# Patient Record
Sex: FEMALE | Race: WHITE | NOT HISPANIC OR LATINO | Employment: FULL TIME | URBAN - METROPOLITAN AREA
[De-identification: names, ages, dates, MRNs, and addresses within clinical notes are randomized per-mention and may not be internally consistent; named-entity substitution may affect disease eponyms.]

---

## 2021-04-17 ENCOUNTER — ANCILLARY PROCEDURE (OUTPATIENT)
Dept: GENERAL RADIOLOGY | Facility: CLINIC | Age: 61
End: 2021-04-17
Attending: FAMILY MEDICINE
Payer: COMMERCIAL

## 2021-04-17 ENCOUNTER — OFFICE VISIT (OUTPATIENT)
Dept: ORTHOPEDICS | Facility: CLINIC | Age: 61
End: 2021-04-17
Payer: COMMERCIAL

## 2021-04-17 DIAGNOSIS — M25.531 RIGHT WRIST PAIN: Primary | ICD-10-CM

## 2021-04-17 DIAGNOSIS — S52.591A OTHER CLOSED FRACTURE OF DISTAL END OF RIGHT RADIUS, INITIAL ENCOUNTER: ICD-10-CM

## 2021-04-17 DIAGNOSIS — M25.531 RIGHT WRIST PAIN: ICD-10-CM

## 2021-04-17 PROCEDURE — 99203 OFFICE O/P NEW LOW 30 MIN: CPT | Performed by: FAMILY MEDICINE

## 2021-04-17 PROCEDURE — 73110 X-RAY EXAM OF WRIST: CPT | Mod: 76 | Performed by: RADIOLOGY

## 2021-04-17 PROCEDURE — 73110 X-RAY EXAM OF WRIST: CPT | Mod: RT | Performed by: RADIOLOGY

## 2021-04-17 NOTE — PROGRESS NOTES
[facility] CLINICS AND SURGERY CENTER  SPORTS & ORTHOPEDIC CLINIC VISIT     Apr 17, 2021        ASSESSMENT & PLAN    59 yo female with displaced right distal radius fracture    Reviewed imaging and assessment with patient in detail  After consent patient, anesthesia was obtained with 7 mL lidocaine for hematoma block  The patient was placed in finger traps and reduction was attempted, but no meaningful improvement in alignment was obtained  She will require up with hand surgeon for further treatment  She was placed in plaster sugartong splint. Neurovascularly intact after splinting and reduction attempts  She will contact us if she does not have adequate pain control with tylenol and ibuprofen alone    Trenton Triplett MD  [facility] ORTHOPEDIC [facility]    -----  Chief Complaint   Patient presents with     Right Wrist - Pain       SUBJECTIVE  Paulette Abobtt is a/an 60 year old female who is seen in consultation at the request of primary care for evaluation of  Right wrist injury.     The patient is seen by themselves.  The patient is Right handed    Onset: 1 day(s) ago. Patient describes injury as tripping over the curb last night. Patient had a video visit with primary care and was sent to us,   Location of Pain: right wrist  Worsened by: Movement   Better with: Elevation   Treatments tried: no treatment tried to date  Associated symptoms: swelling    Orthopedic/Surgical history: NO  Social History/Occupation: CPA       REVIEW OF SYSTEMS:    Do you have fever, chills, weight loss? No    Do you have any vision problems? No    Do you have any chest pain or edema? No    Do you have any shortness of breath or wheezing?  No    Do you have stomach problems? No    Do you have any numbness or focal weakness? No    Do you have diabetes? No    Do you have problems with bleeding or clotting? No    Do you have an rashes or other skin lesions? No    OBJECTIVE:  There were no vitals taken for this visit.      General: Alert, pleasant, no distress  Right wrist: warm, well perfused, SILT throughout. Cap refill brisk     Inspection: swelling and deformity of distal radius     ROM:not attempted     Strength:intact in median ulnar radial nerve      Palpation:no ttp over distal ulna, carpal bones including scaphoid     Special Tests: none      RADIOLOGY:    3 view xrays of right wrist performed and reviewed independently demonstrating comminuted impacted volarly angulated distal radius fracture. See EMR for formal radiology report.       Cast/splint application    Date/Time: 4/17/2021 12:54 PM  Performed by: Angelia Carter ATC  Authorized by: Trenton Triplett MD     Consent:     Consent obtained:  Verbal    Consent given by:  Patient    Risks discussed:  Discoloration, numbness, pain and swelling    Alternatives discussed:  No treatment  Pre-procedure details:     Sensation:  Normal  Procedure details:     Laterality:  Right    Location:  Wrist    Wrist:  R wrist    Splint type: Sugar tong.    Supplies:  Plaster  Post-procedure details:     Pain:  Improved    Pain level:  0/10    Sensation:  Normal    Patient tolerance of procedure:  Tolerated well, no immediate complications    Patient provided with cast or splint care instructions: Yes    Comments:      Angelia Carter ATC on 4/17/2021 at 12:55 PM

## 2021-04-17 NOTE — LETTER
4/17/2021         RE: Paulette Abbott  [address]  [facility] [address]        Dear Colleague,    Thank you for referring your patient, Paulette Abbott, to the [facility]. Please see a copy of my visit note below.      [facility] CLINICS AND SURGERY CENTER  SPORTS & ORTHOPEDIC CLINIC VISIT     Apr 17, 2021        ASSESSMENT & PLAN    61 yo female with displaced right distal radius fracture    Reviewed imaging and assessment with patient in detail  After consent patient, anesthesia was obtained with 7 mL lidocaine for hematoma block  The patient was placed in finger traps and reduction was attempted, but no meaningful improvement in alignment was obtained  She will require up with hand surgeon for further treatment  She was placed in plaster sugartong splint. Neurovascularly intact after splinting and reduction attempts  She will contact us if she does not have adequate pain control with tylenol and ibuprofen alone    Trenton Triplett MD  [facility] ORTHOPEDIC [facility]    -----  Chief Complaint   Patient presents with     Right Wrist - Pain       SUBJECTIVE  Paulette Abbott is a/an 60 year old female who is seen in consultation at the request of primary care for evaluation of  Right wrist injury.     The patient is seen by themselves.  The patient is Right handed    Onset: 1 day(s) ago. Patient describes injury as tripping over the curb last night. Patient had a video visit with primary care and was sent to us,   Location of Pain: right wrist  Worsened by: Movement   Better with: Elevation   Treatments tried: no treatment tried to date  Associated symptoms: swelling    Orthopedic/Surgical history: NO  Social History/Occupation: CPA       REVIEW OF SYSTEMS:    Do you have fever, chills, weight loss? No    Do you have any vision problems? No    Do you have any chest pain or edema? No    Do you have any shortness of breath or wheezing?   No    Do you have stomach problems? No    Do you have any numbness or focal weakness? No    Do you have diabetes? No    Do you have problems with bleeding or clotting? No    Do you have an rashes or other skin lesions? No    OBJECTIVE:  There were no vitals taken for this visit.     General: Alert, pleasant, no distress  Right wrist: warm, well perfused, SILT throughout. Cap refill brisk     Inspection: swelling and deformity of distal radius     ROM:not attempted     Strength:intact in median ulnar radial nerve      Palpation:no ttp over distal ulna, carpal bones including scaphoid     Special Tests: none      RADIOLOGY:    3 view xrays of right wrist performed and reviewed independently demonstrating comminuted impacted volarly angulated distal radius fracture. See EMR for formal radiology report.       Cast/splint application    Date/Time: 4/17/2021 12:54 PM  Performed by: Angelia Carter ATC  Authorized by: Trenton Triplett MD     Consent:     Consent obtained:  Verbal    Consent given by:  Patient    Risks discussed:  Discoloration, numbness, pain and swelling    Alternatives discussed:  No treatment  Pre-procedure details:     Sensation:  Normal  Procedure details:     Laterality:  Right    Location:  Wrist    Wrist:  R wrist    Splint type: Sugar tong.    Supplies:  Plaster  Post-procedure details:     Pain:  Improved    Pain level:  0/10    Sensation:  Normal    Patient tolerance of procedure:  Tolerated well, no immediate complications    Patient provided with cast or splint care instructions: Yes    Comments:      Angelia Carter ATC on 4/17/2021 at 12:55 PM                  Again, thank you for allowing me to participate in the care of your patient.        Sincerely,        Trenton Triplett MD

## 2021-04-17 NOTE — LETTER
Date:April 19, 2021      Patient was self referred, no letter generated. Do not send.        [facility] Health Information

## 2021-04-19 ENCOUNTER — OFFICE VISIT (OUTPATIENT)
Dept: FAMILY MEDICINE | Facility: CLINIC | Age: 61
End: 2021-04-19
Payer: COMMERCIAL

## 2021-04-19 ENCOUNTER — OFFICE VISIT (OUTPATIENT)
Dept: ORTHOPEDICS | Facility: CLINIC | Age: 61
End: 2021-04-19
Payer: COMMERCIAL

## 2021-04-19 ENCOUNTER — TELEPHONE (OUTPATIENT)
Dept: ORTHOPEDICS | Facility: CLINIC | Age: 61
End: 2021-04-19

## 2021-04-19 ENCOUNTER — PRE VISIT (OUTPATIENT)
Dept: ORTHOPEDICS | Facility: CLINIC | Age: 61
End: 2021-04-19

## 2021-04-19 VITALS
WEIGHT: 155.8 LBS | BODY MASS INDEX: 29.42 KG/M2 | HEIGHT: 61 IN | TEMPERATURE: 98.9 F | OXYGEN SATURATION: 98 % | DIASTOLIC BLOOD PRESSURE: 103 MMHG | SYSTOLIC BLOOD PRESSURE: 151 MMHG | HEART RATE: 92 BPM

## 2021-04-19 DIAGNOSIS — S52.501A CLOSED FRACTURE OF RIGHT DISTAL RADIUS: ICD-10-CM

## 2021-04-19 DIAGNOSIS — Z01.818 PRE-OPERATIVE GENERAL PHYSICAL EXAMINATION: Primary | ICD-10-CM

## 2021-04-19 DIAGNOSIS — S52.571A OTHER CLOSED INTRA-ARTICULAR FRACTURE OF DISTAL END OF RIGHT RADIUS, INITIAL ENCOUNTER: Primary | ICD-10-CM

## 2021-04-19 LAB
ALBUMIN SERPL-MCNC: 4 G/DL (ref 3.4–5)
ALP SERPL-CCNC: 76 U/L (ref 40–150)
ALT SERPL W P-5'-P-CCNC: 15 U/L (ref 0–50)
ANION GAP SERPL CALCULATED.3IONS-SCNC: 11 MMOL/L (ref 3–14)
AST SERPL W P-5'-P-CCNC: 15 U/L (ref 0–45)
BASOPHILS # BLD AUTO: 0 10E9/L (ref 0–0.2)
BASOPHILS NFR BLD AUTO: 0.4 %
BILIRUB SERPL-MCNC: 0.6 MG/DL (ref 0.2–1.3)
BUN SERPL-MCNC: 11 MG/DL (ref 7–30)
CALCIUM SERPL-MCNC: 9.6 MG/DL (ref 8.5–10.1)
CHLORIDE SERPL-SCNC: 107 MMOL/L (ref 94–109)
CO2 SERPL-SCNC: 21 MMOL/L (ref 20–32)
CREAT SERPL-MCNC: 0.62 MG/DL (ref 0.52–1.04)
DIFFERENTIAL METHOD BLD: NORMAL
EOSINOPHIL # BLD AUTO: 0 10E9/L (ref 0–0.7)
EOSINOPHIL NFR BLD AUTO: 0.3 %
ERYTHROCYTE [DISTWIDTH] IN BLOOD BY AUTOMATED COUNT: 11.7 % (ref 10–15)
GFR SERPL CREATININE-BSD FRML MDRD: >90 ML/MIN/{1.73_M2}
GLUCOSE SERPL-MCNC: 94 MG/DL (ref 70–99)
HCT VFR BLD AUTO: 42.5 % (ref 35–47)
HGB BLD-MCNC: 14 G/DL (ref 11.7–15.7)
IMM GRANULOCYTES # BLD: 0 10E9/L (ref 0–0.4)
IMM GRANULOCYTES NFR BLD: 0.3 %
LABORATORY COMMENT REPORT: NORMAL
LYMPHOCYTES # BLD AUTO: 1.4 10E9/L (ref 0.8–5.3)
LYMPHOCYTES NFR BLD AUTO: 19.2 %
MCH RBC QN AUTO: 29.2 PG (ref 26.5–33)
MCHC RBC AUTO-ENTMCNC: 32.9 G/DL (ref 31.5–36.5)
MCV RBC AUTO: 89 FL (ref 78–100)
MONOCYTES # BLD AUTO: 0.5 10E9/L (ref 0–1.3)
MONOCYTES NFR BLD AUTO: 6.5 %
NEUTROPHILS # BLD AUTO: 5.4 10E9/L (ref 1.6–8.3)
NEUTROPHILS NFR BLD AUTO: 73.3 %
NRBC # BLD AUTO: 0 10*3/UL
NRBC BLD AUTO-RTO: 0 /100
PLATELET # BLD AUTO: 164 10E9/L (ref 150–450)
POTASSIUM SERPL-SCNC: 3.8 MMOL/L (ref 3.4–5.3)
PROT SERPL-MCNC: 8.1 G/DL (ref 6.8–8.8)
RBC # BLD AUTO: 4.8 10E12/L (ref 3.8–5.2)
SARS-COV-2 RNA RESP QL NAA+PROBE: NEGATIVE
SARS-COV-2 RNA RESP QL NAA+PROBE: NORMAL
SODIUM SERPL-SCNC: 139 MMOL/L (ref 133–144)
SPECIMEN SOURCE: NORMAL
SPECIMEN SOURCE: NORMAL
TSH SERPL DL<=0.005 MIU/L-ACNC: 0.71 MU/L (ref 0.4–4)
WBC # BLD AUTO: 7.4 10E9/L (ref 4–11)

## 2021-04-19 PROCEDURE — U0005 INFEC AGEN DETEC AMPLI PROBE: HCPCS | Mod: 90 | Performed by: PATHOLOGY

## 2021-04-19 PROCEDURE — 99204 OFFICE O/P NEW MOD 45 MIN: CPT | Mod: 57 | Performed by: PHYSICIAN ASSISTANT

## 2021-04-19 PROCEDURE — U0003 INFECTIOUS AGENT DETECTION BY NUCLEIC ACID (DNA OR RNA); SEVERE ACUTE RESPIRATORY SYNDROME CORONAVIRUS 2 (SARS-COV-2) (CORONAVIRUS DISEASE [COVID-19]), AMPLIFIED PROBE TECHNIQUE, MAKING USE OF HIGH THROUGHPUT TECHNOLOGIES AS DESCRIBED BY CMS-2020-01-R: HCPCS | Mod: 90 | Performed by: PATHOLOGY

## 2021-04-19 PROCEDURE — 99000 SPECIMEN HANDLING OFFICE-LAB: CPT | Performed by: PATHOLOGY

## 2021-04-19 ASSESSMENT — PATIENT HEALTH QUESTIONNAIRE - PHQ9: SUM OF ALL RESPONSES TO PHQ QUESTIONS 1-9: 5

## 2021-04-19 ASSESSMENT — MIFFLIN-ST. JEOR: SCORE: 1207.49

## 2021-04-19 NOTE — PROGRESS NOTES
"[facility]  [address] [address]  Phone: [phone]  Fax: [phone]  Primary Provider: No primary care provider on file.  Pre-op Performing Provider: MARIAH CHESTER   :[id number]}  PREOPERATIVE EVALUATION:  Today's date: 4/19/2021    Paulette Abbott is a 60 year old female who presents for a preoperative evaluation.    Surgical Information:  Surgery/Procedure: OPEN REDUCTION INTERNAL FIXATION, FRACTURE, WRIST  Surgery Location: [facility]  Surgeon: Edu Estrella MD  Surgery Date: 4/21/21  Time of Surgery: unknown  Where patient plans to recover: At home with family  Fax number for surgical facility: Note does not need to be faxed, will be available electronically in Epic.    Type of Anesthesia Anticipated: MAC with Block    Subjective     HPI related to upcoming procedure: Paulette slipped on a curb 3 days ago and fell on her right hand/wrist and then on the right side of her face.  She went to the orthopedic walk-in clinic the next morning at the [facility], was diagnosed with a fracture.  She is having surgery to repair on Wednesday, 4/21/21.      Paulette \"does not see health care providers,\"  She has been told off and on that her BP has been high, at work.  It was normal last year, but slightly elevated the year before.  She has not had routine health maintenance for many years.  She has not had any particular health problems.      Preop Questions 4/19/2021   1. Have you ever had a heart attack or stroke? No   2. Have you ever had surgery on your heart or blood vessels, such as a stent placement, a coronary artery bypass, or surgery on an artery in your head, neck, heart, or legs? No   3. Do you have chest pain with activity? No   4. Do you have a history of  heart failure? No   5. Do you currently have a cold, bronchitis or symptoms of other infection? No   6. Do you have a cough, shortness of breath, or wheezing? No   7. Do you or anyone in your family have previous " history of blood clots? No   8. Do you or does anyone in your family have a serious bleeding problem such as prolonged bleeding following surgeries or cuts? No   9. Have you ever had problems with anemia or been told to take iron pills? No   10. Have you had any abnormal blood loss such as black, tarry or bloody stools, or abnormal vaginal bleeding? No   11. Have you ever had a blood transfusion? No   12. Are you willing to have a blood transfusion if it is medically needed before, during, or after your surgery? Yes   13. Have you or any of your relatives ever had problems with anesthesia? No   14. Do you have sleep apnea, excessive snoring or daytime drowsiness? No   15. Do you have any artifical heart valves or other implanted medical devices like a pacemaker, defibrillator, or continuous glucose monitor? No   16. Do you have artificial joints? No   17. Are you allergic to latex? No     Health Care Directive:  Patient does not have a Health Care Directive or Living Will: Discussed advance care planning with patient; however, patient declined at this time.[id number]}    Review of Systems  CONSTITUTIONAL: NEGATIVE for fever, chills, change in weight  INTEGUMENTARY/SKIN: NEGATIVE for worrisome rashes, moles or lesions  EYES: NEGATIVE for vision changes or irritation  ENT/MOUTH: NEGATIVE for ear, mouth and throat problems  RESP: NEGATIVE for significant cough or SOB  BREAST: NEGATIVE for masses, tenderness or discharge  CV: NEGATIVE for chest pain, palpitations or peripheral edema  GI: NEGATIVE for nausea, abdominal pain, heartburn, or change in bowel habits  : NEGATIVE for frequency, dysuria, or hematuria  MUSCULOSKELETAL: NEGATIVE for significant arthralgias or myalgia  NEURO: NEGATIVE for weakness, dizziness or paresthesias  ENDOCRINE: NEGATIVE for temperature intolerance, skin/hair changes  HEME: NEGATIVE for bleeding problems  PSYCHIATRIC: NEGATIVE for changes in mood or affect    There are no active problems to  display for this patient.     No past medical history on file.  No past surgical history on file.  No current outpatient medications on file.       Allergies   Allergen Reactions     Penicillins Hives        Social History     Tobacco Use     Smoking status: Not on file   Substance Use Topics     Alcohol use: Not on file     Family History   Problem Relation Age of Onset     Hypertension Mother      Colon Cancer Father      Hypertension Father      Diabetes Maternal Grandmother      Rectal Cancer Brother      Hypertension Brother      History   Drug Use Not on file         Objective     There were no vitals taken for this visit.    Physical Exam    GENERAL APPEARANCE: healthy, alert and mild distress     EYES: EOMI, PERRL     HENT: ear canals and TM's normal and nose and mouth without ulcers or lesions  Face:  Bruising above right eye and forehead, right face.     NECK: no adenopathy, no asymmetry, masses, or scars and thyroid normal to palpation     RESP: lungs clear to auscultation - no rales, rhonchi or wheezes     CV: regular rates and rhythm, normal S1 S2, no S3 or S4 and no murmur, click or rub     ABDOMEN:  soft, nontender, no HSM or masses and bowel sounds normal     MS: extremities normal- no gross deformities noted, no evidence of inflammation in joints, FROM in all extremities, except right hand.  Right hand and arm bandaged and in a sling.  Fingers of the right hand slightly swollen and bruised.      SKIN: no suspicious lesions or rashes     NEURO: Normal strength and tone, sensory exam grossly normal, mentation intact and speech normal     PSYCH: mentation appears normal. and affect normal/bright     LYMPHATICS: No cervical adenopathy    Diagnostics:  Labs pending at this time.  Results will be reviewed when available.   No EKG required for low risk surgery (cataract, skin procedure, breast biopsy, etc).    Revised Cardiac Risk Index (RCRI):  The patient has the following serious cardiovascular risks  for perioperative complications:   - No serious cardiac risks = 0 points     RCRI Interpretation: 0 points: Class I (very low risk - 0.4% complication rate)    Paulette agreed to return to clinic for health maintenance and blood pressure concerns after recovery from surgery.         Signed Electronically by: Daysi Shoemaker NP  Copy of this evaluation report is provided to requesting physician.

## 2021-04-19 NOTE — LETTER
4/19/2021         RE: Paulette Abbott  [address]  [facility] [address]        Dear Colleague,    Thank you for referring your patient, Paulette Abbott, to the [facility] ORTHOPEDIC CLINIC [place]. Please see a copy of my visit note below.    Hand Surgery History & Physical    REFERRING PHYSICIAN: No ref. provider found   PRIMARY CARE PHYSICIAN: No primary care provider on file.     Chief Complaint:   Consult (right distal radius fx DOI 4/16/21 )    History of Present Illness:     Paulette Abbott is a 60 year old right-hand dominant female who presents for evaluation of right distal radius fracture. This occurred on 4/16 when she tripped on a curb, falling on her outstretched hand. She was seen at our orthopedic walk in clinic on Saturday 4/17 where she underwent closed reduction and splinting in a sugar tong splint.     She has been taking Tylenol as needed for pain. Pain has been well controlled. Denies numbness or tingling.    Past Medical History:   No past medical history on file.    Past Surgical History:   No past surgical history on file.    Social History:     Social History     Tobacco Use     Smoking status: Not on file   Substance Use Topics     Alcohol use: Not on file     Works as a CPA.     Family History:   No family history on file.    Allergies:   No Known Allergies    Medications:     No current outpatient medications on file.     No current facility-administered medications for this visit.         Review of Systems:     A 10 point ROS was performed and reviewed. Specific responses to these questions are noted at the end of the document.    Physical Exam:   Physical Exam Adult:  General: Well-nourished, alert, cooperative with exam and in no acute distress  HEENT: Atraumatic, normocephalic, extraocular movements intact, moist mucous membranes, trachea midline  Pulmonary: Unlabored work of breathing, on room air  Cardiovascular: Warm and well-perfused extremities. 2+  radial pulses  Skin: Warm, intact without rashes to the upper extremities, head or neck   Gait: Normal  Neuro/psych: Oriented to time, place and person. Affect is appropriate    Musculoskeletal: A focused physical examination of the RUE reveals:   Inspection - sugar tong splint in place. Edema and ecchymosis of the thumb and fingers.   Palpation - Deferred.   Neurovascular- intact light touch sensation and motor to distribution of the median, ulnar and radial nerves. Brisk capillary refill to the distal fingers. 2+ radial pulse.   Range of Motion: Able to flex and extend all fingers and thumbs within the confines of the splint.   Muscle strength and tone: 4/5 strength EPL, FPL, APB, first dorsal interosseous.               Imaging:   3 view X-ray of the right wrist was independently interpreted by me. The results were discussed with the patient.  Findings include: Comminuted intraarticular and mildly impacted distal radius fracture with slight volar displacement.     Assessment and Plan:   Assessment:  60 year old female with right closed intraarticular distal radius fracture.      Plan: We had a lengthy discussion about the diagnosis, radiographic findings, and possible treatment options.  I discussed with the patient that sometimes distal radius fractures can be treated with and without surgery.     In this case, in light of the current fracture alignment, I recommend surgical intervention in the form of open reduction internal fixation . The patient is in agreement. I briefly described the procedure and post operative plan. I discussed the  risks and benefits of surgery, including but not limited to: infection, bleeding, pain, scarring, damage to nerves, blood vessels, or other nearby structures, malunion, nonunion, hardware failure or malposition, stiffness, need for further surgery, wound healing issues, and anesthetic risks. The patient expressed understanding and elected to proceed with open reduction  internal fixation of the right distal radius fracture.  This will be performed by Dr. Estrella on Wednesday 4/21.     Radiographs and plan discussed with Dr. Edu Estrella who is in agreement with the plan.     Elly Posada PA-C   Orthopedic Surgery  4/19/2021 9:19 AM        Again, thank you for allowing me to participate in the care of your patient.        Sincerely,        Elly Posada PA-C

## 2021-04-19 NOTE — TELEPHONE ENCOUNTER
RECORDS RECEIVED FROM: Right distal radius fracture   DATE RECEIVED: Apr 19, 2021     NOTES STATUS DETAILS   OFFICE NOTE from referring provider Internal  Trenton Triplett MD        OFFICE NOTE from other specialist N/A    DISCHARGE SUMMARY from hospital N/A    DISCHARGE REPORT from the ER N/A    OPERATIVE REPORT N/A    MEDICATION LIST Internal    IMPLANT RECORD/STICKER N/A    LABS     CBC/DIFF N/A    CULTURES N/A    INJECTIONS DONE IN RADIOLOGY N/A    MRI N/A    CT SCAN N/A    XRAYS (IMAGES & REPORTS) Internal    TUMOR     PATHOLOGY  Slides & report N/A      04/19/21   9:35 AM   COMPLETE  Rosemarie Scott CMA

## 2021-04-19 NOTE — TELEPHONE ENCOUNTER
Called patient and LVM letting her know that we would like to have her come in to be evaluated for her distal radius fracture today (4/19/21). Left direct call back number to call back and make appointment.

## 2021-04-19 NOTE — NURSING NOTE
"ROOM:2    60 year old  Chief Complaint   Patient presents with     Pre-Op Exam     Fall       Blood pressure (!) 151/103, pulse 92, temperature 98.9  F (37.2  C), temperature source Oral, height 1.547 m (5' 0.9\"), weight 70.7 kg (155 lb 12.8 oz), SpO2 98 %. Body mass index is 29.53 kg/m .      Patient Active Problem List   Diagnosis     Closed fracture of right distal radius       Wt Readings from Last 2 Encounters:   04/19/21 70.7 kg (155 lb 12.8 oz)     BP Readings from Last 3 Encounters:   04/19/21 (!) 151/103       Allergies   Allergen Reactions     Penicillins Hives       No current outpatient medications on file.     No current facility-administered medications for this visit.        Social History     Tobacco Use     Smoking status: Former Smoker     Smokeless tobacco: Never Used   Substance Use Topics     Alcohol use: None     Drug use: None       Honoring Choices - Health Care Directive Guide offered to patient at time of visit.    Health Maintenance Due   Topic Date Due     PREVENTIVE CARE VISIT  Never done     ADVANCE CARE PLANNING  Never done     MAMMO SCREENING  Never done     COLORECTAL CANCER SCREENING  Never done     HIV SCREENING  Never done     HEPATITIS C SCREENING  Never done     PAP  Never done     DTAP/TDAP/TD IMMUNIZATION (1 - Tdap) Never done     LIPID  Never done     ZOSTER IMMUNIZATION (1 of 2) Never done     INFLUENZA VACCINE (1) 09/01/2020     COVID-19 Vaccine (2 - Pfizer 2-dose series) 04/22/2021         There is no immunization history on file for this patient.    No results found for: PAP      No lab results found.    PHQ-2 ( 1999 Pfizer) 4/17/2021   Q1: Little interest or pleasure in doing things 0   Q2: Feeling down, depressed or hopeless 0   PHQ-2 Score 0       PHQ-9 SCORE 4/19/2021   PHQ-9 Total Score 5       No flowsheet data found.    No flowsheet data found.      Keyanna Kline, [facility]  April 19, 2021 1:03 PM    "

## 2021-04-19 NOTE — LETTER
Date:April 23, 2021      Patient was self referred, no letter generated. Do not send.        [facility] Health Information

## 2021-04-19 NOTE — PROGRESS NOTES
Hand Surgery History & Physical    REFERRING PHYSICIAN: No ref. provider found   PRIMARY CARE PHYSICIAN: No primary care provider on file.     Chief Complaint:   Consult (right distal radius fx DOI 4/16/21 )    History of Present Illness:     Paulette Abbott is a 60 year old right-hand dominant female who presents for evaluation of right distal radius fracture. This occurred on 4/16 when she tripped on a curb, falling on her outstretched hand. She was seen at our orthopedic walk in clinic on Saturday 4/17 where she underwent closed reduction and splinting in a sugar tong splint.     She has been taking Tylenol as needed for pain. Pain has been well controlled. Denies numbness or tingling.    Past Medical History:   No past medical history on file.    Past Surgical History:   No past surgical history on file.    Social History:     Social History     Tobacco Use     Smoking status: Not on file   Substance Use Topics     Alcohol use: Not on file     Works as a CPA.     Family History:   No family history on file.    Allergies:   No Known Allergies    Medications:     No current outpatient medications on file.     No current facility-administered medications for this visit.         Review of Systems:     A 10 point ROS was performed and reviewed. Specific responses to these questions are noted at the end of the document.    Physical Exam:   Physical Exam Adult:  General: Well-nourished, alert, cooperative with exam and in no acute distress  HEENT: Atraumatic, normocephalic, extraocular movements intact, moist mucous membranes, trachea midline  Pulmonary: Unlabored work of breathing, on room air  Cardiovascular: Warm and well-perfused extremities. 2+ radial pulses  Skin: Warm, intact without rashes to the upper extremities, head or neck   Gait: Normal  Neuro/psych: Oriented to time, place and person. Affect is appropriate    Musculoskeletal: A focused physical examination of the RUE reveals:   Inspection - sugar tong  splint in place. Edema and ecchymosis of the thumb and fingers.   Palpation - Deferred.   Neurovascular- intact light touch sensation and motor to distribution of the median, ulnar and radial nerves. Brisk capillary refill to the distal fingers. 2+ radial pulse.   Range of Motion: Able to flex and extend all fingers and thumbs within the confines of the splint.   Muscle strength and tone: 4/5 strength EPL, FPL, APB, first dorsal interosseous.               Imaging:   3 view X-ray of the right wrist was independently interpreted by me. The results were discussed with the patient.  Findings include: Comminuted intraarticular and mildly impacted distal radius fracture with slight volar displacement.     Assessment and Plan:   Assessment:  60 year old female with right closed intraarticular distal radius fracture.      Plan: We had a lengthy discussion about the diagnosis, radiographic findings, and possible treatment options.  I discussed with the patient that sometimes distal radius fractures can be treated with and without surgery.     In this case, in light of the current fracture alignment, I recommend surgical intervention in the form of open reduction internal fixation . The patient is in agreement. I briefly described the procedure and post operative plan. I discussed the  risks and benefits of surgery, including but not limited to: infection, bleeding, pain, scarring, damage to nerves, blood vessels, or other nearby structures, malunion, nonunion, hardware failure or malposition, stiffness, need for further surgery, wound healing issues, and anesthetic risks. The patient expressed understanding and elected to proceed with open reduction internal fixation of the right distal radius fracture.  This will be performed by Dr. Estrella on Wednesday 4/21.     Radiographs and plan discussed with Dr. Edu Estrella who is in agreement with the plan.     Elly Posada PA-C   Orthopedic Surgery  4/19/2021 9:19 AM

## 2021-04-19 NOTE — NURSING NOTE
Teaching Flowsheet   Relevant Diagnosis: Closed intra-articular fracture of distal end of right radius  Teaching Topic: open reduction internal fixation right distal radius fracture repair    [facility] [place] MAC with Regional Block anesthesia with Dr Edu Estrella on Wednesday 4-21-21.    BMI 29.53  No current medications.  Covid test completed today at [facility] and pre-op will be today at 1:20 pm with NP clinic, [facility].     Person(s) involved in teaching:   Patient     Motivation Level:  Asks Questions: Yes  Eager to Learn: Yes  Cooperative: Yes  Receptive (willing/able to accept information): Yes  Any cultural factors/Muslim beliefs that may influence understanding or compliance? No       Patient demonstrates understanding of the following:  Reason for the appointment, diagnosis and treatment plan: Yes  Knowledge of proper use of medications and conditions for which they are ordered (with special attention to potential side effects or drug interactions): Yes  Which situations necessitate calling provider and whom to contact: Yes       Teaching Concerns Addressed:        Proper use and care of  (medical equip, care aids, etc.): Yes  Nutritional needs and diet plan: Yes  Pain management techniques: Yes  Wound Care: Yes  How and/when to access community resources: Yes     Instructional Materials Used/Given: Preoperative teaching packet, directions, phone numbers and map to the NP clinic [facility], antibacterial soap.  Nancy Garcia RN

## 2021-04-19 NOTE — NURSING NOTE
Reason For Visit:   Chief Complaint   Patient presents with     Consult     right distal radius fx DOI 4/16/21        Primary MD: No primary care provider on file.  Ref. MD:      ?  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  Work status?  Full time.  Date of injury: 4/16/21  Type of injury: Fall.  Date of surgery: NA  Type of surgery: NA.  Smoker: No  Request smoking cessation information: No      There were no vitals taken for this visit.      Pain Assessment  Patient Currently in Pain: Yes  0-10 Pain Scale: 3    Hand Dominance Evaluation  Hand Dominance: Right          QuickDASH Assessment  No flowsheet data found.       Allergies   Allergen Reactions     Penicillins Mike Figueroa, ATC

## 2021-04-20 ENCOUNTER — ANESTHESIA EVENT (OUTPATIENT)
Dept: SURGERY | Facility: AMBULATORY SURGERY CENTER | Age: 61
End: 2021-04-20

## 2021-04-21 ENCOUNTER — ANESTHESIA (OUTPATIENT)
Dept: SURGERY | Facility: AMBULATORY SURGERY CENTER | Age: 61
End: 2021-04-21

## 2021-04-21 ENCOUNTER — HOSPITAL ENCOUNTER (OUTPATIENT)
Facility: AMBULATORY SURGERY CENTER | Age: 61
Discharge: HOME | End: 2021-04-21
Attending: ORTHOPAEDIC SURGERY | Admitting: ORTHOPAEDIC SURGERY
Payer: COMMERCIAL

## 2021-04-21 VITALS
HEIGHT: 61 IN | DIASTOLIC BLOOD PRESSURE: 77 MMHG | SYSTOLIC BLOOD PRESSURE: 122 MMHG | TEMPERATURE: 98.8 F | BODY MASS INDEX: 29.27 KG/M2 | RESPIRATION RATE: 16 BRPM | HEART RATE: 74 BPM | WEIGHT: 155 LBS | OXYGEN SATURATION: 100 %

## 2021-04-21 DIAGNOSIS — S52.501A CLOSED FRACTURE OF RIGHT DISTAL RADIUS: ICD-10-CM

## 2021-04-21 DIAGNOSIS — S52.571A OTHER CLOSED INTRA-ARTICULAR FRACTURE OF DISTAL END OF RIGHT RADIUS, INITIAL ENCOUNTER: ICD-10-CM

## 2021-04-21 DIAGNOSIS — S52.501A CLOSED FRACTURE OF DISTAL END OF RIGHT RADIUS, UNSPECIFIED FRACTURE MORPHOLOGY, INITIAL ENCOUNTER: Primary | ICD-10-CM

## 2021-04-21 PROCEDURE — 25609 OPTX DST RD XART FX/EP SEP3+: CPT

## 2021-04-21 DEVICE — IMPLANTABLE DEVICE: Type: IMPLANTABLE DEVICE | Site: WRIST | Status: FUNCTIONAL

## 2021-04-21 RX ORDER — CLINDAMYCIN PHOSPHATE 900 MG/50ML
900 INJECTION, SOLUTION INTRAVENOUS
Status: COMPLETED | OUTPATIENT
Start: 2021-04-21 | End: 2021-04-21

## 2021-04-21 RX ORDER — GABAPENTIN 300 MG/1
300 CAPSULE ORAL ONCE
Status: COMPLETED | OUTPATIENT
Start: 2021-04-21 | End: 2021-04-21

## 2021-04-21 RX ORDER — OXYCODONE HYDROCHLORIDE 5 MG/1
5 TABLET ORAL
Status: DISCONTINUED | OUTPATIENT
Start: 2021-04-21 | End: 2021-04-22 | Stop reason: HOSPADM

## 2021-04-21 RX ORDER — ALBUTEROL SULFATE 0.83 MG/ML
2.5 SOLUTION RESPIRATORY (INHALATION) EVERY 4 HOURS PRN
Status: DISCONTINUED | OUTPATIENT
Start: 2021-04-21 | End: 2021-04-21 | Stop reason: HOSPADM

## 2021-04-21 RX ORDER — NALOXONE HYDROCHLORIDE 0.4 MG/ML
0.2 INJECTION, SOLUTION INTRAMUSCULAR; INTRAVENOUS; SUBCUTANEOUS
Status: DISCONTINUED | OUTPATIENT
Start: 2021-04-21 | End: 2021-04-21 | Stop reason: HOSPADM

## 2021-04-21 RX ORDER — LIDOCAINE 40 MG/G
CREAM TOPICAL
Status: DISCONTINUED | OUTPATIENT
Start: 2021-04-21 | End: 2021-04-21 | Stop reason: HOSPADM

## 2021-04-21 RX ORDER — FENTANYL CITRATE 50 UG/ML
25-50 INJECTION, SOLUTION INTRAMUSCULAR; INTRAVENOUS
Status: DISCONTINUED | OUTPATIENT
Start: 2021-04-21 | End: 2021-04-21 | Stop reason: HOSPADM

## 2021-04-21 RX ORDER — ACETAMINOPHEN 325 MG/1
650 TABLET ORAL EVERY 4 HOURS PRN
Qty: 50 TABLET | Refills: 0 | Status: SHIPPED | OUTPATIENT
Start: 2021-04-21 | End: 2021-10-25

## 2021-04-21 RX ORDER — CLINDAMYCIN PHOSPHATE 900 MG/50ML
900 INJECTION, SOLUTION INTRAVENOUS SEE ADMIN INSTRUCTIONS
Status: DISCONTINUED | OUTPATIENT
Start: 2021-04-21 | End: 2021-04-21 | Stop reason: HOSPADM

## 2021-04-21 RX ORDER — OXYCODONE HYDROCHLORIDE 5 MG/1
5 TABLET ORAL EVERY 4 HOURS PRN
Status: DISCONTINUED | OUTPATIENT
Start: 2021-04-21 | End: 2021-04-22 | Stop reason: HOSPADM

## 2021-04-21 RX ORDER — FLUMAZENIL 0.1 MG/ML
0.2 INJECTION, SOLUTION INTRAVENOUS
Status: DISCONTINUED | OUTPATIENT
Start: 2021-04-21 | End: 2021-04-21 | Stop reason: HOSPADM

## 2021-04-21 RX ORDER — NALOXONE HYDROCHLORIDE 0.4 MG/ML
0.2 INJECTION, SOLUTION INTRAMUSCULAR; INTRAVENOUS; SUBCUTANEOUS
Status: DISCONTINUED | OUTPATIENT
Start: 2021-04-21 | End: 2021-04-22 | Stop reason: HOSPADM

## 2021-04-21 RX ORDER — NALOXONE HYDROCHLORIDE 0.4 MG/ML
0.4 INJECTION, SOLUTION INTRAMUSCULAR; INTRAVENOUS; SUBCUTANEOUS
Status: DISCONTINUED | OUTPATIENT
Start: 2021-04-21 | End: 2021-04-21 | Stop reason: HOSPADM

## 2021-04-21 RX ORDER — OXYCODONE HYDROCHLORIDE 5 MG/1
5-10 TABLET ORAL EVERY 4 HOURS PRN
Qty: 6 TABLET | Refills: 0 | Status: SHIPPED | OUTPATIENT
Start: 2021-04-21 | End: 2021-10-25

## 2021-04-21 RX ORDER — SODIUM CHLORIDE, SODIUM LACTATE, POTASSIUM CHLORIDE, CALCIUM CHLORIDE 600; 310; 30; 20 MG/100ML; MG/100ML; MG/100ML; MG/100ML
INJECTION, SOLUTION INTRAVENOUS CONTINUOUS
Status: DISCONTINUED | OUTPATIENT
Start: 2021-04-21 | End: 2021-04-22 | Stop reason: HOSPADM

## 2021-04-21 RX ORDER — NALOXONE HYDROCHLORIDE 0.4 MG/ML
0.4 INJECTION, SOLUTION INTRAMUSCULAR; INTRAVENOUS; SUBCUTANEOUS
Status: DISCONTINUED | OUTPATIENT
Start: 2021-04-21 | End: 2021-04-22 | Stop reason: HOSPADM

## 2021-04-21 RX ORDER — MEPERIDINE HYDROCHLORIDE 25 MG/ML
12.5 INJECTION INTRAMUSCULAR; INTRAVENOUS; SUBCUTANEOUS
Status: DISCONTINUED | OUTPATIENT
Start: 2021-04-21 | End: 2021-04-22 | Stop reason: HOSPADM

## 2021-04-21 RX ORDER — PROPOFOL 10 MG/ML
INJECTION, EMULSION INTRAVENOUS CONTINUOUS PRN
Status: DISCONTINUED | OUTPATIENT
Start: 2021-04-21 | End: 2021-04-21

## 2021-04-21 RX ORDER — KETOROLAC TROMETHAMINE 30 MG/ML
30 INJECTION, SOLUTION INTRAMUSCULAR; INTRAVENOUS EVERY 6 HOURS PRN
Status: DISCONTINUED | OUTPATIENT
Start: 2021-04-21 | End: 2021-04-22 | Stop reason: HOSPADM

## 2021-04-21 RX ORDER — ACETAMINOPHEN 325 MG/1
650 TABLET ORAL
Status: DISCONTINUED | OUTPATIENT
Start: 2021-04-21 | End: 2021-04-22 | Stop reason: HOSPADM

## 2021-04-21 RX ORDER — ACETAMINOPHEN 325 MG/1
975 TABLET ORAL ONCE
Status: COMPLETED | OUTPATIENT
Start: 2021-04-21 | End: 2021-04-21

## 2021-04-21 RX ORDER — SODIUM CHLORIDE, SODIUM LACTATE, POTASSIUM CHLORIDE, CALCIUM CHLORIDE 600; 310; 30; 20 MG/100ML; MG/100ML; MG/100ML; MG/100ML
INJECTION, SOLUTION INTRAVENOUS CONTINUOUS
Status: DISCONTINUED | OUTPATIENT
Start: 2021-04-21 | End: 2021-04-21 | Stop reason: HOSPADM

## 2021-04-21 RX ORDER — ONDANSETRON 4 MG/1
4 TABLET, ORALLY DISINTEGRATING ORAL EVERY 30 MIN PRN
Status: DISCONTINUED | OUTPATIENT
Start: 2021-04-21 | End: 2021-04-22 | Stop reason: HOSPADM

## 2021-04-21 RX ORDER — BUPIVACAINE HYDROCHLORIDE 5 MG/ML
INJECTION, SOLUTION EPIDURAL; INTRACAUDAL PRN
Status: DISCONTINUED | OUTPATIENT
Start: 2021-04-21 | End: 2021-04-21

## 2021-04-21 RX ORDER — PROPOFOL 10 MG/ML
INJECTION, EMULSION INTRAVENOUS PRN
Status: DISCONTINUED | OUTPATIENT
Start: 2021-04-21 | End: 2021-04-21

## 2021-04-21 RX ORDER — ONDANSETRON 2 MG/ML
4 INJECTION INTRAMUSCULAR; INTRAVENOUS EVERY 30 MIN PRN
Status: DISCONTINUED | OUTPATIENT
Start: 2021-04-21 | End: 2021-04-22 | Stop reason: HOSPADM

## 2021-04-21 RX ADMIN — PROPOFOL 150 MCG/KG/MIN: 10 INJECTION, EMULSION INTRAVENOUS at 15:49

## 2021-04-21 RX ADMIN — BUPIVACAINE HYDROCHLORIDE 20 ML: 5 INJECTION, SOLUTION EPIDURAL; INTRACAUDAL at 14:48

## 2021-04-21 RX ADMIN — FENTANYL CITRATE 50 MCG: 50 INJECTION, SOLUTION INTRAMUSCULAR; INTRAVENOUS at 14:47

## 2021-04-21 RX ADMIN — SODIUM CHLORIDE, SODIUM LACTATE, POTASSIUM CHLORIDE, CALCIUM CHLORIDE: 600; 310; 30; 20 INJECTION, SOLUTION INTRAVENOUS at 14:34

## 2021-04-21 RX ADMIN — GABAPENTIN 300 MG: 300 CAPSULE ORAL at 14:30

## 2021-04-21 RX ADMIN — ACETAMINOPHEN 975 MG: 325 TABLET ORAL at 14:30

## 2021-04-21 RX ADMIN — PROPOFOL 50 MG: 10 INJECTION, EMULSION INTRAVENOUS at 15:49

## 2021-04-21 RX ADMIN — CLINDAMYCIN PHOSPHATE 900 MG: 900 INJECTION, SOLUTION INTRAVENOUS at 15:41

## 2021-04-21 ASSESSMENT — MIFFLIN-ST. JEOR: SCORE: 1205.46

## 2021-04-21 NOTE — DISCHARGE INSTRUCTIONS
"[facility]  Home Care Following Anesthesia  For 24 hours after surgery:  1. Get plenty of rest.  A responsible adult must stay with you for at least 24 hours after you leave the surgery center.  2. Do not drive or use heavy equipment.  If you have weakness or tingling, don't drive or use heavy equipment until this feeling goes away.   3. Do not drink alcohol.   4. Avoid strenuous or risky activities.  Ask for help when climbing stairs.  5. You may feel lightheaded.  IF so, sit for a few minutes before standing.  Have someone help you get up.   6. If you have nausea (feel sick to your stomach): Drink only clear liquids such as apple juice, ginger ale, broth or 7-Up.  Rest may also help.  Be sure to drink enough fluids.  Move to a regular diet as you feel able.   7. You may have a slight fever.  Call the doctor if your fever is over 100 F (37.7 C) (taken under the tongue) or lasts longer than 24 hours.  8. You may have a dry mouth, a sore throat, muscle aches or trouble sleeping. These should go away after 24 hours.  9. Do not make important or legal decisions.   10. It is recommended to avoid smoking.   Today you received an Exparel block to numb the nerves near your surgery site.  This is a block using local anesthetic or \"numbing\" medication injected around the nerves to anesthetize or \"numb\" the area supplied by those nerves.  This block is injected into the muscle layer near your surgical site.  This medication may numb the location where you had surgery up to 72 hours.  If your surgical site is an arm or leg you should be careful with your affected limb, since it is possible to injure your limb without being aware of it due to the numbing.  Until full feeling returns, you should guard against bumping or hitting your limb, and avoid extreme hot or cold temperatures on the skin.  As the block wears off, the feeling will return as a tingling or prickly sensation near your " "surgical site.  You will experince more discomfort from your incision as the feeling returns.  You may want to take a pain pill (a narcotic or Tylenol if this was prescribed by your surgeon) when you start to experience mild pain before the pain beomes more severe.  If your pain medications do not control your pain, you should notify your surgeon.\"}    Tips for taking pain medications  To get the best pain relief possible, remember these points:    Take pain medications as directed, before pain becomes severe.    Pain medication can upset your stomach: taking it with food may help.    Constipation is a common side effect of pain medication. Drink plenty of  fluids.    Eat foods high in fiber. Take a stool softener if recommended by your doctor or pharmacist.    Do not drink alcohol, drive or operate machinery while taking pain medications.    Ask about other ways to control pain, such as with heat, ice or relaxation.    Tylenol/Acetaminophen Consumption  To help encourage the safe use of acetaminophen, the makers of TYLENOL  have lowered the maximum daily dose for single-ingredient Extra Strength TYLENOL  (acetaminophen) products sold in the [place] from 8 pills per day (4,000 mg) to 6 pills per day (3,000 mg). The dosing interval has also changed from 2 pills every 4-6 hours to 2 pills every 6 hours.    If you feel your pain relief is insufficient, you may take Tylenol/Acetaminophen in addition to your narcotic pain medication.     Be careful not to exceed 3,000 mg of Tylenol/Acetaminophen in a 24 hour period from all sources.    If you are taking extra strength Tylenol/acetaminophen (500 mg), the maximum dose is 6 tablets in 24 hours.    If you are taking regular strength acetaminophen (325 mg), the maximum dose is 9 tablets in 24 hours.    Call a doctor for any of the followin. Signs of infection (fever, growing tenderness at the surgery site, a large amount of drainage or bleeding, severe pain, foul-smelling " drainage, redness, swelling).  2. It has been over 8 to 10 hours since surgery and you are still not able to urinate (pass water).  3. Headache for over 24 hours.  4. Numbness, tingling or weakness the day after surgery (if you had spinal anesthesia).  5. Signs of Covid-19 infection (temperature over 100 degrees, shortness of breath, cough, loss of taste/smell, generalized body aches, persistent headache, chills, sore throat, nausea/vomiting/diarrhea)  Your doctor is:  Dr. Edu Estrella, Orthopaedics: [phone]  ***               Or dial [phone] and ask for the resident on call for:  Orthopaedics  For emergency care, call the:  [facility] Emergency Department: [phone] (TTY for hearing impaired: [phone])  Tylenol 975 mg given @ 2:30 PM ok to take again@ @ 8:30 PM

## 2021-04-21 NOTE — ANESTHESIA POSTPROCEDURE EVALUATION
Patient: Paulette Abbott    Procedure(s):  OPEN REDUCTION INTERNAL FIXATION, FRACTURE, WRIST    Diagnosis:Closed fracture of right distal radius [S52.501A]  Diagnosis Additional Information: No value filed.    Anesthesia Type:  MAC    Note:  Disposition: Outpatient   Postop Pain Control: Uneventful            Sign Out: Well controlled pain   PONV: No   Neuro/Psych: Uneventful            Sign Out: Acceptable/Baseline neuro status   Airway/Respiratory: Uneventful            Sign Out: Acceptable/Baseline resp. status   CV/Hemodynamics: Uneventful            Sign Out: Acceptable CV status; No obvious hypovolemia; No obvious fluid overload   Other NRE: NONE   DID A NON-ROUTINE EVENT OCCUR? No           Last vitals:  Vitals:    04/21/21 1700 04/21/21 1703 04/21/21 1730   BP:  118/81 122/77   Pulse:  71 74   Resp:  16 16   Temp:  37.1  C (98.8  F)    SpO2: 99% 99% 100%       Last vitals prior to Anesthesia Care Transfer:  CRNA VITALS  4/21/2021 1628 - 4/21/2021 1728      4/21/2021             Pulse:  69    SpO2:  99 %          Electronically Signed By: Gm Peña MD  April 21, 2021  5:38 PM

## 2021-04-21 NOTE — ANESTHESIA PREPROCEDURE EVALUATION
Anesthesia Pre-Procedure Evaluation    Patient: Paulette Abbott   MRN: [id number] : 1960        Preoperative Diagnosis: Closed fracture of right distal radius [S52.501A]   Procedure : Procedure(s):  OPEN REDUCTION INTERNAL FIXATION, FRACTURE, WRIST     Past Medical History:   Diagnosis Date     Hypertension       Past Surgical History:   Procedure Laterality Date     EYE SURGERY        Allergies   Allergen Reactions     Penicillins Hives      Social History     Tobacco Use     Smoking status: Former Smoker     Smokeless tobacco: Never Used   Substance Use Topics     Alcohol use: Yes     Comment: 0-1/week      Wt Readings from Last 1 Encounters:   21 70.3 kg (155 lb)        Anesthesia Evaluation            ROS/MED HX  ENT/Pulmonary:       Neurologic:       Cardiovascular:     (+) hypertension-----    METS/Exercise Tolerance:     Hematologic:       Musculoskeletal:       GI/Hepatic:       Renal/Genitourinary:       Endo:       Psychiatric/Substance Use:       Infectious Disease:       Malignancy:       Other:            Physical Exam    Airway        Mallampati: II   TM distance: > 3 FB      Respiratory Devices and Support         Dental           Cardiovascular          Rhythm and rate: regular and normal     Pulmonary           breath sounds clear to auscultation           OUTSIDE LABS:  CBC:   Lab Results   Component Value Date    WBC 7.4 2021    HGB 14.0 2021    HCT 42.5 2021     2021     BMP:   Lab Results   Component Value Date     2021    POTASSIUM 3.8 2021    CHLORIDE 107 2021    CO2 21 2021    BUN 11 2021    CR 0.62 2021    GLC 94 2021     COAGS: No results found for: PTT, INR, FIBR  POC: No results found for: BGM, HCG, HCGS  HEPATIC:   Lab Results   Component Value Date    ALBUMIN 4.0 2021    PROTTOTAL 8.1 2021    ALT 15 2021    AST 15 2021    ALKPHOS 76 2021    BILITOTAL 0.6  04/19/2021     OTHER:   Lab Results   Component Value Date    KELSY 9.6 04/19/2021    TSH 0.71 04/19/2021       Anesthesia Plan    ASA Status:  2   NPO Status:  NPO Appropriate    Anesthesia Type: MAC.      Maintenance: TIVA.        Consents    Anesthesia Plan(s) and associated risks, benefits, and realistic alternatives discussed. Questions answered and patient/representative(s) expressed understanding.     - Discussed with:  Patient         Postoperative Care       PONV prophylaxis: Ondansetron (or other 5HT-3)     Comments:         H&P reviewed: Unable to attach H&P to encounter due to EHR limitations. H&P Update: appropriate H&P reviewed, patient examined. No interval changes since H&P (within 30 days).         Gm Peña MD

## 2021-04-21 NOTE — ANESTHESIA PROCEDURE NOTES
Supraclavicular Procedure Note  Pre-Procedure   Staff -        Anesthesiologist:  Gm Peña MD       Performed By: anesthesiologist       Location: pre-op       Pre-Anesthestic Checklist: patient identified, IV checked, site marked, risks and benefits discussed, informed consent, monitors and equipment checked, pre-op evaluation, at physician/surgeon's request and post-op pain management  Timeout:       Correct Patient: Yes        Correct Procedure: Yes        Correct Site: Yes        Correct Position: Yes        Correct Laterality: Yes        Site Marked: Yes  Procedure Documentation  Procedure: Supraclavicular       Diagnosis: POST OPERATIVE PAIN       Laterality: right       Patient Position: sitting       Patient Prep/Sterile Barriers: sterile gloves, mask       Skin prep: Chloraprep       Needle Type: short bevel and insulated       Needle Gauge: 21.        Needle Length (millimeters): 110        - Ultrasound guided       - Ultrasound used to identify targeted nerve, plexus, vascular marker, or fascial plane and place a needle adjacent to it in real-time       - Ultrasound was used to visualize the spread of anesthetic in close proximity to the above referenced structure       - A permanent image is entered into the patient's record.    Assessment/Narrative         The placement was negative for: blood aspirated, painful injection and site bleeding       Paresthesias: No.     Bolus given via needle..        Secured via.        Insertion/Infusion Method: Single Shot       Complications: none       Injection made incrementally with aspirations every 5 mL.    Comments:  133mg exparel used

## 2021-04-21 NOTE — ANESTHESIA CARE TRANSFER NOTE
Patient: Paulette Abbott    Procedure(s):  OPEN REDUCTION INTERNAL FIXATION, FRACTURE, WRIST    Diagnosis: Closed fracture of right distal radius [S52.501A]  Diagnosis Additional Information: No value filed.    Anesthesia Type:   MAC     Note:    Oropharynx: oropharynx clear of all foreign objects and spontaneously breathing  Level of Consciousness: awake  Oxygen Supplementation: room air    Independent Airway: airway patency satisfactory and stable  Dentition: dentition unchanged      Patient transferred to: Phase II  Comments: Uneventful transport   Report to RN  Exchanging well; color natl  Pt responds appropriately to command  IV patent  Lips/teeth/dentition as preop status  Questions answered  /77  HR 71  TAT 98.8  RR 16  Sat 100% room air    Handoff Report: Identifed the Patient, Identified the Reponsible Provider, Reviewed the pertinent medical history, Discussed the surgical course, Reviewed Intra-OP anesthesia mangement and issues during anesthesia, Set expectations for post-procedure period and Allowed opportunity for questions and acknowledgement of understanding      Vitals: (Last set prior to Anesthesia Care Transfer)  CRNA VITALS  4/21/2021 1628 - 4/21/2021 1705      4/21/2021             Pulse:  69    SpO2:  99 %        Electronically Signed By: OSORIO LANIER CRNA  April 21, 2021  5:05 PM

## 2021-04-22 ENCOUNTER — ANCILLARY PROCEDURE (OUTPATIENT)
Dept: RADIOLOGY | Facility: AMBULATORY SURGERY CENTER | Age: 61
End: 2021-04-22
Attending: ORTHOPAEDIC SURGERY
Payer: COMMERCIAL

## 2021-04-22 DIAGNOSIS — R52 PAIN: Primary | ICD-10-CM

## 2021-04-22 NOTE — OP NOTE
Procedure Date: 04/21/2021    DATE OF PROCEDURE:  04/21/2021    PREOPERATIVE DIAGNOSIS:  Right distal radius fracture.    POSTOPERATIVE DIAGNOSIS:  Right distal radius fracture.    PROCEDURE:  Open reduction and internal fixation of right distal radius fracture.    SURGEON:  Edu Estrella MD     ASSISTANT:  Martha Harrison MD, PGY-4    ANESTHESIA:  Regional block.    HISTORY OF PRESENT ILLNESS AND INDICATIONS:  Paulette is a 60-year-old female, fell and injured her right wrist.  She has a comminuted displaced right distal radius fracture.  She presents today for open reduction and internal fixation of the fracture.  We discussed the risks and benefits, and those include, but are not limited to, infection, bleeding, injury to blood vessels, tendons or nerves, stiffness, nonunion, malunion and possibly need for more surgery.  All questions were answered, and operative consents were signed.    PROCEDURE NOTE:  The patient was taken to the operating room after a regional block was supplied by the Anesthesia Service to the right upper extremity.  She was placed supine on the OR table, and the right hand was placed on the hand table.  A tourniquet was placed on the right upper extremity.  Right upper extremity was prepped and draped in standard sterile fashion.  The limb was exsanguinated, and the tourniquet was elevated to 250 mmHg.  A 6 cm volar incision was made over the FCR tendon, extending proximally from the wrist crease.  The soft tissues were dissected down to the FCR.  This was retracted ulnarly.  The radial artery was identified and protected.  Soft tissues were dissected bluntly down to the pronator quadratus.  This was released off of the radius and dissected ulnarly.  The fracture was identified.  It was fairly comminuted with a volar ulnar corner fragment, but this was extraarticular.  The fracture was reduced anatomically and then stabilized volarly with an Arthrex distal radius volar plate.  All screws  were placed in standard AO fashion.  Fluoroscopic views in the AP and lateral planes as well as a radial inclination view demonstrated an anatomic reduction of the fracture and appropriate placement of the hardware.  The wound was irrigated copiously.  The pronator was attempted to be repaired as much as possible with a 3-0 Vicryl.  The subcutaneous tissue was closed with 3-0 Vicryl, and the skin was closed with 5-0 nylon.  Her right wrist was placed in a volar splint.  Blood loss was approximately 5 mL. No complications, and she went to the recovery room in stable condition.    Postoperatively, sutures will be removed in approximately 7-10 days.  She can go into a thermoplastic OT splint at that point and begin some gentle early motion.    Edu Estrella MD        D: 2021   T: 2021   MT: Marcus    Name:     NATHAN MEYERS  MRN:      [id number]        Account:        [id number]   :      1960           Procedure Date: 2021     Document: [id number]

## 2021-05-03 ENCOUNTER — ANCILLARY PROCEDURE (OUTPATIENT)
Dept: GENERAL RADIOLOGY | Facility: CLINIC | Age: 61
End: 2021-05-03
Attending: PHYSICIAN ASSISTANT
Payer: COMMERCIAL

## 2021-05-03 ENCOUNTER — OFFICE VISIT (OUTPATIENT)
Dept: ORTHOPEDICS | Facility: CLINIC | Age: 61
End: 2021-05-03
Payer: COMMERCIAL

## 2021-05-03 ENCOUNTER — THERAPY VISIT (OUTPATIENT)
Dept: OCCUPATIONAL THERAPY | Facility: CLINIC | Age: 61
End: 2021-05-03
Payer: COMMERCIAL

## 2021-05-03 DIAGNOSIS — R52 PAIN: ICD-10-CM

## 2021-05-03 DIAGNOSIS — Z48.89 AFTERCARE FOLLOWING SURGERY FOR INJURY AND TRAUMA: ICD-10-CM

## 2021-05-03 DIAGNOSIS — S52.571A OTHER CLOSED INTRA-ARTICULAR FRACTURE OF DISTAL END OF RIGHT RADIUS, INITIAL ENCOUNTER: ICD-10-CM

## 2021-05-03 DIAGNOSIS — Z98.890 POST-OPERATIVE STATE: Primary | ICD-10-CM

## 2021-05-03 DIAGNOSIS — M25.531 RIGHT WRIST PAIN: Primary | ICD-10-CM

## 2021-05-03 DIAGNOSIS — S52.571D OTHER CLOSED INTRA-ARTICULAR FRACTURE OF DISTAL END OF RIGHT RADIUS WITH ROUTINE HEALING, SUBSEQUENT ENCOUNTER: ICD-10-CM

## 2021-05-03 PROCEDURE — 99024 POSTOP FOLLOW-UP VISIT: CPT | Performed by: PHYSICIAN ASSISTANT

## 2021-05-03 PROCEDURE — 73110 X-RAY EXAM OF WRIST: CPT | Mod: RT | Performed by: RADIOLOGY

## 2021-05-03 PROCEDURE — 97110 THERAPEUTIC EXERCISES: CPT | Mod: GO | Performed by: OCCUPATIONAL THERAPIST

## 2021-05-03 PROCEDURE — 97165 OT EVAL LOW COMPLEX 30 MIN: CPT | Mod: GO | Performed by: OCCUPATIONAL THERAPIST

## 2021-05-03 PROCEDURE — 97760 ORTHOTIC MGMT&TRAING 1ST ENC: CPT | Mod: GO | Performed by: OCCUPATIONAL THERAPIST

## 2021-05-03 NOTE — PROGRESS NOTES
Date of Service: May 3, 2021    Reason for visit: Postoperative follow-up    Date of Surgery: 4/21/21    Procedure Performed: Open reduction internal fixation  right distal radius fracture    Interval events: The patient comes to see me in follow-up today from the above surgery. Pain has been well managed with only tylenol. No fevers or chills. No numbess or tingling. No other complaints today.     Physical examination:  Well-developed, well-nourished and in no acute distress.  Alert and oriented to surroundings.  On examination of the right wrist, incision is clean, dry and intact. Sutures in place.  There is no erythema, drainage, or dehiscence. Sensation is intact throughout digits. Patient can actively flex and extend all digits and thumb. Fingers are warm and well-perfused.     Radiographs: Three views of the right wrist were obtained and reviewed. These demonstrate no changes in hardware or bony alignment.     Assessment: Progressing appropriately following the above procedure    Plan: Sutures removed today. The patient will be seen by hand therapy today and fitted for a custom zipper splint. The patient may begin gentle AROM of the wrist in all planes. The splint is to be worn at all times except for exercise and showers. The patient is to lift nothing heavier than the weight of a coffee cup. The patient may wash the incision with soap and water daily and pat it dry but should avoid any prolonged soaking such as dish-washing, tub baths, or swimming until the skin is completely healed (usually around the 3-4 week aida). The steristrips covering the wound may be allowed to fall off on their own. We will see the patient back for a postoperative visit at 6 weeks postop. Knows to contact us in the interim if any questions, concerns, or other issues arise.     GERARDO OLMEDO PA-C  5/3/2021 11:06 AM   Orthopaedic Surgery

## 2021-05-03 NOTE — LETTER
5/3/2021         RE: Paulette Abbott  [id number]  [facility] [address]        Dear Colleague,    Thank you for referring your patient, Paulette Abbott, to the [facility] [place]. Please see a copy of my visit note below.    Date of Service: May 3, 2021    Reason for visit: Postoperative follow-up    Date of Surgery: 4/21/21    Procedure Performed: Open reduction internal fixation  right distal radius fracture    Interval events: The patient comes to see me in follow-up today from the above surgery. Pain has been well managed with only tylenol. No fevers or chills. No numbess or tingling. No other complaints today.     Physical examination:  Well-developed, well-nourished and in no acute distress.  Alert and oriented to surroundings.  On examination of the right wrist, incision is clean, dry and intact. Sutures in place.  There is no erythema, drainage, or dehiscence. Sensation is intact throughout digits. Patient can actively flex and extend all digits and thumb. Fingers are warm and well-perfused.     Radiographs: Three views of the right wrist were obtained and reviewed. These demonstrate no changes in hardware or bony alignment.     Assessment: Progressing appropriately following the above procedure    Plan: Sutures removed today. The patient will be seen by hand therapy today and fitted for a custom zipper splint. The patient may begin gentle AROM of the wrist in all planes. The splint is to be worn at all times except for exercise and showers. The patient is to lift nothing heavier than the weight of a coffee cup. The patient may wash the incision with soap and water daily and pat it dry but should avoid any prolonged soaking such as dish-washing, tub baths, or swimming until the skin is completely healed (usually around the 3-4 week aida). The steristrips covering the wound may be allowed to fall off on their own. We will see the patient back for a postoperative  visit at 6 weeks postop. Knows to contact us in the interim if any questions, concerns, or other issues arise.     GERARDO OLMEDO PA-C  5/3/2021 11:06 AM   Orthopaedic Surgery

## 2021-05-03 NOTE — PROGRESS NOTES
[facility] Initial Evaluation     Current Date: 5/3/2021    Diagnosis: R DRFx  DOI: 4/16/21  DOS: 4/21/21  Procedure: R DRFx ORIF  Post: 1 w 5 d    Subjective:    Patient Health History  Paulette Abbott being seen for broken wrist.     Problem began: 4/16/2021.   Problem occurred: tripped   Pain is reported as 3/10 on pain scale.  General health as reported by patient is good.  Pertinent medical history includes: none.     Medical allergies: other. Other medical allergies details: penicillin.   Surgeries include:  Orthopedic surgery and other. Other surgery history details: detached retina.    Current medications:  None.    Current occupation is cpa.   Primary job tasks include:  Computer work, driving and prolonged sitting.                  Occupational Profile Information:  Right hand dominant      Functional Outcome Measure:   Upper Extremity Functional Index Score:  SCORE:   Column Totals: /80: (P) 29   (A lower score indicates greater disability.)    Objective:  Pain Level (Scale 0-10)   5/3/2021   At Rest 1 today   At worst 2     Pain Description  Date 5/3/2021   Location wrist   Pain Quality Sharp lately   Frequency intermittent     Pain is worst  daytime   Exacerbated by  varies   Relieved by rest   Progression improving     Sensation    WNL throughout all nerve distributions; per patient report    Scar    Sensitivity: mild Quality:  Closed, steristrips in place    Edema mild of the R wrist    ROM    Wrist 5/3/2021 5/3/2021   AROM (PROM) R L   Extension 35 65   Flexion 20 70   RD 15 25   UD 29 58   Supination 65 72   Pronation 75 80       Thumb 5/3/2021 5/3/2021   AROM (PROM) R L   MP /43 /62   IP /37 /65   RABD     PABD     Retropulsion     Kapandji Opposition Scale (0-10/10) 8 9.5      Finger ROM  5/3/2021  Can make near full fist    Assessment:  Patient presents with symptoms consistent with diagnosis of right wrist distal radius fracture, with surgical  intervention.     Patient's limitations or  Problem List includes:  Pain, Decreased ROM/motion, Increased edema and Weakness of the right wrist and hand which interferes with the patient's ability to perform Self Care Tasks (all), Work Tasks, Recreational Activities and Household Chores as compared to previous level of function.    Rehab Potential:  Excellent - Return to full activity, no limitations    Patient will benefit from skilled Occupational Therapy to increase ROM and decrease pain, edema and adherence of scarring to return to previous activity level and resume normal daily tasks and to reach their rehab potential.    Barriers to Learning:  No barrier    Communication Issues:  Patient appears to be able to clearly communicate and understand verbal and written communication and follow directions correctly.    Chart Review: Brief history including review of medical and/or therapy records relating to the presenting problem and Simple history review with patient    Identified Performance Deficits: dressing, home establishment and management, meal preparation and cleanup and work    Assessment of Occupational Performance:  3-5 Performance Deficits    Clinical Decision Making (Complexity): Low complexity    Treatment Explanation:  The following has been discussed with the patient:  RX ordered/plan of care  Anticipated outcomes  Possible risks and side effects    Plan:  Frequency:  1 X week, once daily  Duration:  for 4 weeks tapering to   2 X a month over 8 weeks    Treatment Plan:    Modalities:    Fluidotherapy and Paraffin   Therapeutic Exercise:    AROM, AAROM, PROM, Tendon Gliding, Extensor Tracking, Isotonics, Isometrics and Stabilization  Therapeutic Activities:   Functional activities   Neuromuscular re-ed:   Proprioceptive Training  Manual Techniques:   Scar mobilization, Friction massage, Myofascial release and Manual edema mobilization  Orthotic Fabrication:    Static orthosis and Forearm based orthosis  Self Care:    Self Care Tasks, Ergonomic  Considerations and Work Tasks    Discharge Plan:    Achieve all LTG.  Independent in home treatment program.  Reach maximal therapeutic benefit.    Home Program: See flowsheet    Next visit/ Plan: See flowsheet    Thank you for the opportunity to work with this patient.   If you have questions or concerns, please contact me with an in basket message or via the information below.     Charmaine [facility]), MS, OTR/L, CHT  [facility]    Email: [email or web address]  Voicemail: [phone]   Hand Hotline (urgent hand therapy questions): [phone]  Fax: [phone]    [facility]  [address] [facility], [address] [place] [address]

## 2021-05-03 NOTE — NURSING NOTE
Reason For Visit:   Chief Complaint   Patient presents with     Surgical Followup     12 day pop DOS 4/21/21 ORIF right distal radius fx with         Primary MD: No primary care provider on file.  Ref. MD: rebeca     Age: 60 year old    ?  No      There were no vitals taken for this visit.      Pain Assessment  Patient Currently in Pain: Yes  0-10 Pain Scale: 1               QuickDASH Assessment  No flowsheet data found.       Current Outpatient Medications   Medication Sig Dispense Refill     acetaminophen (TYLENOL) 325 MG tablet Take 2 tablets (650 mg) by mouth every 4 hours as needed for mild pain 50 tablet 0     oxyCODONE (ROXICODONE) 5 MG tablet Take 1-2 tablets (5-10 mg) by mouth every 4 hours as needed for moderate to severe pain (Patient not taking: Reported on 5/3/2021) 6 tablet 0       Allergies   Allergen Reactions     Penicillins Mike Figueroa, ATC

## 2021-05-09 ENCOUNTER — HEALTH MAINTENANCE LETTER (OUTPATIENT)
Age: 61
End: 2021-05-09

## 2021-05-10 ENCOUNTER — THERAPY VISIT (OUTPATIENT)
Dept: OCCUPATIONAL THERAPY | Facility: CLINIC | Age: 61
End: 2021-05-10
Payer: COMMERCIAL

## 2021-05-10 DIAGNOSIS — M25.531 RIGHT WRIST PAIN: Primary | ICD-10-CM

## 2021-05-10 PROCEDURE — 97110 THERAPEUTIC EXERCISES: CPT | Mod: GO | Performed by: OCCUPATIONAL THERAPIST

## 2021-05-10 PROCEDURE — 97112 NEUROMUSCULAR REEDUCATION: CPT | Mod: GO | Performed by: OCCUPATIONAL THERAPIST

## 2021-05-10 PROCEDURE — 97535 SELF CARE MNGMENT TRAINING: CPT | Mod: GO | Performed by: OCCUPATIONAL THERAPIST

## 2021-05-10 NOTE — PROGRESS NOTES
SOAP note information for 5/10/2021.  Please refer to the daily flowsheet for treatment today, total treatment time and time spent performing 1:1 timed codes.       Diagnosis: R DRFx  DOI: 4/16/21  DOS: 4/21/21  Procedure: R DRFx ORIF  Post: 2 w 5 d  Right hand dominant      Objective:  Pain Level (Scale 0-10)   5/3/2021   At Rest 1 today   At worst 2     Pain Description  Date 5/3/2021   Location wrist   Pain Quality Sharp lately   Frequency intermittent     Pain is worst  daytime   Exacerbated by  varies   Relieved by rest   Progression improving     Sensation    WNL throughout all nerve distributions; per patient report    Scar    Sensitivity: mild Quality:  Closed, steristrips in place    Edema mild of the R wrist    ROM    Wrist 5/3/2021 5/3/2021 5/10/2021     AROM (PROM) R L R   Extension 35 65 35   Flexion 20 70 15   RD 15 25 15   UD 29 58 26   Supination 65 72    Pronation 75 80        Thumb 5/3/2021 5/3/2021   AROM (PROM) R L   MP /43 /62   IP /37 /65   RABD     PABD     Retropulsion     Kapandji Opposition Scale (0-10/10) 8 9.5      Finger ROM  5/3/2021  Can make near full fist

## 2021-05-24 ENCOUNTER — THERAPY VISIT (OUTPATIENT)
Dept: OCCUPATIONAL THERAPY | Facility: CLINIC | Age: 61
End: 2021-05-24
Payer: COMMERCIAL

## 2021-05-24 DIAGNOSIS — M25.531 RIGHT WRIST PAIN: Primary | ICD-10-CM

## 2021-05-24 PROCEDURE — 97112 NEUROMUSCULAR REEDUCATION: CPT | Mod: GO | Performed by: OCCUPATIONAL THERAPIST

## 2021-05-24 PROCEDURE — 97535 SELF CARE MNGMENT TRAINING: CPT | Mod: GO | Performed by: OCCUPATIONAL THERAPIST

## 2021-05-24 PROCEDURE — 97110 THERAPEUTIC EXERCISES: CPT | Mod: GO | Performed by: OCCUPATIONAL THERAPIST

## 2021-05-24 NOTE — PROGRESS NOTES
SOAP note information for 5/10/2021.  Please refer to the daily flowsheet for treatment today, total treatment time and time spent performing 1:1 timed codes.       Diagnosis: R DRFx  DOI: 4/16/21  DOS: 4/21/21  Procedure: R DRFx ORIF  Post: 4 w 5 d  Right hand dominant      Objective:  Pain Level (Scale 0-10)   5/3/2021 5/24/2021     At Rest 1 today Sometimes a little twinge of pain   At worst 2      Pain Description  Date 5/3/2021   Location wrist   Pain Quality Sharp lately   Frequency intermittent     Pain is worst  daytime   Exacerbated by  varies   Relieved by rest   Progression improving     Sensation    WNL throughout all nerve distributions; per patient report    Scar    Sensitivity: mild Quality:  Closed, steristrips removed  Edema mild of the R wrist    ROM    Wrist 5/3/2021 5/3/2021 5/10/2021   5/24/2021     AROM (PROM) R L R R   Extension 35 65 35 28   Flexion 20 70 15 18   RD 15 25 15 15   UD 29 58 26 25   Supination 65 72  78   Pronation 75 80  75       Thumb 5/3/2021 5/3/2021   AROM (PROM) R L   MP /43 /62   IP /37 /65   RABD     PABD     Retropulsion     Kapandji Opposition Scale (0-10/10) 8 9.5      Finger ROM  WNL

## 2021-05-27 DIAGNOSIS — S52.571D OTHER CLOSED INTRA-ARTICULAR FRACTURE OF DISTAL END OF RIGHT RADIUS WITH ROUTINE HEALING, SUBSEQUENT ENCOUNTER: Primary | ICD-10-CM

## 2021-06-02 ENCOUNTER — ANCILLARY PROCEDURE (OUTPATIENT)
Dept: GENERAL RADIOLOGY | Facility: CLINIC | Age: 61
End: 2021-06-02
Attending: PHYSICIAN ASSISTANT
Payer: COMMERCIAL

## 2021-06-02 ENCOUNTER — OFFICE VISIT (OUTPATIENT)
Dept: ORTHOPEDICS | Facility: CLINIC | Age: 61
End: 2021-06-02
Payer: COMMERCIAL

## 2021-06-02 ENCOUNTER — THERAPY VISIT (OUTPATIENT)
Dept: OCCUPATIONAL THERAPY | Facility: CLINIC | Age: 61
End: 2021-06-02
Payer: COMMERCIAL

## 2021-06-02 DIAGNOSIS — M25.531 RIGHT WRIST PAIN: Primary | ICD-10-CM

## 2021-06-02 DIAGNOSIS — S52.571D OTHER CLOSED INTRA-ARTICULAR FRACTURE OF DISTAL END OF RIGHT RADIUS WITH ROUTINE HEALING, SUBSEQUENT ENCOUNTER: ICD-10-CM

## 2021-06-02 DIAGNOSIS — Z98.890 POST-OPERATIVE STATE: Primary | ICD-10-CM

## 2021-06-02 PROCEDURE — 97535 SELF CARE MNGMENT TRAINING: CPT | Mod: GO | Performed by: OCCUPATIONAL THERAPIST

## 2021-06-02 PROCEDURE — 99024 POSTOP FOLLOW-UP VISIT: CPT | Performed by: PHYSICIAN ASSISTANT

## 2021-06-02 PROCEDURE — 97140 MANUAL THERAPY 1/> REGIONS: CPT | Mod: GO | Performed by: OCCUPATIONAL THERAPIST

## 2021-06-02 PROCEDURE — 97110 THERAPEUTIC EXERCISES: CPT | Mod: GO | Performed by: OCCUPATIONAL THERAPIST

## 2021-06-02 PROCEDURE — 73110 X-RAY EXAM OF WRIST: CPT | Mod: RT | Performed by: RADIOLOGY

## 2021-06-02 NOTE — NURSING NOTE
Reason For Visit:   Chief Complaint   Patient presents with     Surgical Followup     6 wk pop DOS 4/21/21 ORIF right wrist fx with         Primary MD: No primary care provider on file.  Ref. MD: rebeca     Age: 60 year old    ?  No      There were no vitals taken for this visit.      Pain Assessment  Patient Currently in Pain: No               QuickDASH Assessment  No flowsheet data found.       Current Outpatient Medications   Medication Sig Dispense Refill     acetaminophen (TYLENOL) 325 MG tablet Take 2 tablets (650 mg) by mouth every 4 hours as needed for mild pain 50 tablet 0     oxyCODONE (ROXICODONE) 5 MG tablet Take 1-2 tablets (5-10 mg) by mouth every 4 hours as needed for moderate to severe pain (Patient not taking: Reported on 5/3/2021) 6 tablet 0       Allergies   Allergen Reactions     Penicillins Mike Figueroa, ATC

## 2021-06-02 NOTE — PROGRESS NOTES
SOAP note information for 6/2/2021.  Please refer to the daily flowsheet for treatment today, total treatment time and time spent performing 1:1 timed codes.       Diagnosis: R DRFx  DOI: 4/16/21  DOS: 4/21/21  Procedure: R DRFx ORIF  Right hand dominant    Per MD: 6/2/2021   The patient may wean out of the splint at this time; progress range of motion as tolerated with hand therapy and may begin strengthening exercises. No lifting greater than a gallon of milk for one month, then progress activity as tolerated. Avoid painful activities. Otherwise no activity restrictions.    Objective:  Pain Level (Scale 0-10)   5/3/2021 5/24/2021     At Rest 1 today Sometimes a little twinge of pain   At worst 2      Pain Description  Date 5/3/2021   Location wrist   Pain Quality Sharp lately   Frequency intermittent     Pain is worst  daytime   Exacerbated by  varies   Relieved by rest   Progression improving     Sensation    WNL throughout all nerve distributions; per patient report    Scar    Sensitivity: mild Quality:  Closed, steristrips removed  Edema mild of the R wrist    ROM    Wrist 5/3/2021 5/3/2021 5/24/2021   6/2/2021     AROM (PROM) R L R R   Extension 35 65 28 28   Flexion 20 70 18 20   RD 15 25 15    UD 29 58 25    Supination 65 72 78    Pronation 75 80 75        Thumb 5/3/2021 5/3/2021   AROM (PROM) R L   MP /43 /62   IP /37 /65   RABD     PABD     Retropulsion     Kapandji Opposition Scale (0-10/10) 8 9.5      Finger ROM  WNL

## 2021-06-02 NOTE — LETTER
6/2/2021         RE: Paulette Abbott  [address]  [facility] [address]        Dear Colleague,    Thank you for referring your patient, Paulette Abbott, to the [facility] [place]. Please see a copy of my visit note below.    Date of Service: Jun 2, 2021    Reason for visit: Postoperative follow-up    Date of Surgery: 4/21/21    Procedure Performed: Open reduction internal fixation  right distal radius fracture    Interval events: The patient comes to see me in follow-up today from the above surgery. The patient reports that their wrist range of motion has been improving, but notes that she wishes she was farther along. They've been doing their therapy exercises. Pain has been well managed without need for narcotics. Wearing zipper splint as instructed.     Physical examination:  Well-developed, well-nourished and in no acute distress.  Alert and oriented to surroundings.  On examination of the wrist, incision is well-healed. There is no erythema, drainage, or dehiscence. Swelling is Mild. No distal sensory deficits noted. Patient can actively flex and extend all digits and thumb. T Fingers are warm and well-perfused. AROM of the wrist is as follows: 45  extension, 20  flexion, 65  supination, 65  pronation.    Radiographs: Three views of the right wrist were obtained and reviewed. These demonstrate no changes in hardware or bony alignment.     Assessment: Progressing appropriately following the above procedure    Plan:  The patient may wean out of the splint at this time. The patient may progress range of motion as tolerated with hand therapy and may begin strengthening exercises. No lifting greater than a gallon of milk for one month, then progress activity as tolerated. Avoid painful activities. Otherwise no activity restrictions.The patient will follow up for a  postoperative visit in 6 weeks time. Knows to contact us if any questions, concerns, or other issues arise.      GERARDO OLMEDO PA-C  6/2/2021 10:56 AM   Orthopaedic Surgery

## 2021-06-02 NOTE — PROGRESS NOTES
Date of Service: Jun 2, 2021    Reason for visit: Postoperative follow-up    Date of Surgery: 4/21/21    Procedure Performed: Open reduction internal fixation  right distal radius fracture    Interval events: The patient comes to see me in follow-up today from the above surgery. The patient reports that their wrist range of motion has been improving, but notes that she wishes she was farther along. They've been doing their therapy exercises. Pain has been well managed without need for narcotics. Wearing zipper splint as instructed.     Physical examination:  Well-developed, well-nourished and in no acute distress.  Alert and oriented to surroundings.  On examination of the wrist, incision is well-healed. There is no erythema, drainage, or dehiscence. Swelling is Mild. No distal sensory deficits noted. Patient can actively flex and extend all digits and thumb. T Fingers are warm and well-perfused. AROM of the wrist is as follows: 45  extension, 20  flexion, 65  supination, 65  pronation.    Radiographs: Three views of the right wrist were obtained and reviewed. These demonstrate no changes in hardware or bony alignment.     Assessment: Progressing appropriately following the above procedure    Plan:  The patient may wean out of the splint at this time. The patient may progress range of motion as tolerated with hand therapy and may begin strengthening exercises. No lifting greater than a gallon of milk for one month, then progress activity as tolerated. Avoid painful activities. Otherwise no activity restrictions.The patient will follow up for a  postoperative visit in 6 weeks time. Knows to contact us if any questions, concerns, or other issues arise.     GERARDO OLMEDO PA-C  6/2/2021 10:56 AM   Orthopaedic Surgery

## 2021-06-09 ENCOUNTER — THERAPY VISIT (OUTPATIENT)
Dept: OCCUPATIONAL THERAPY | Facility: CLINIC | Age: 61
End: 2021-06-09
Payer: COMMERCIAL

## 2021-06-09 DIAGNOSIS — M25.531 RIGHT WRIST PAIN: Primary | ICD-10-CM

## 2021-06-09 PROCEDURE — 97110 THERAPEUTIC EXERCISES: CPT | Mod: GO | Performed by: OCCUPATIONAL THERAPIST

## 2021-06-09 PROCEDURE — 97140 MANUAL THERAPY 1/> REGIONS: CPT | Mod: GO | Performed by: OCCUPATIONAL THERAPIST

## 2021-06-09 NOTE — PROGRESS NOTES
SOAP note information for 6/9/2021.  Please refer to the daily flowsheet for treatment today, total treatment time and time spent performing 1:1 timed codes.           Diagnosis: R DRFx  DOI: 4/16/21  DOS: 4/21/21  Procedure: R DRFx ORIF  Right hand dominant    Per MD: 6/2/2021   The patient may wean out of the splint at this time; progress range of motion as tolerated with hand therapy and may begin strengthening exercises. No lifting greater than a gallon of milk for one month, then progress activity as tolerated. Avoid painful activities. Otherwise no activity restrictions.    Objective:  Pain Level (Scale 0-10)   5/3/2021 5/24/2021     At Rest 1 today Sometimes a little twinge of pain   At worst 2      Pain Description  Date 5/3/2021   Location wrist   Pain Quality Sharp lately   Frequency intermittent     Pain is worst  daytime   Exacerbated by  varies   Relieved by rest   Progression improving     Sensation    WNL throughout all nerve distributions; per patient report    Scar    Sensitivity: mild Quality:  Closed, steristrips removed  Edema mild of the R wrist    ROM    Wrist 5/3/2021 5/3/2021 5/24/2021   6/2/2021   6/9/2021     AROM (PROM) R L R R R   Extension 35 65 28 28 37   Flexion 20 70 18 20 25   RD 15 25 15  19   UD 29 58 25  28   Supination 65 72 78     Pronation 75 80 75         Thumb 5/3/2021 5/3/2021   AROM (PROM) R L   MP /43 /62   IP /37 /65   RABD     PABD     Retropulsion     Kapandji Opposition Scale (0-10/10) 8 9.5      Finger ROM  WNL

## 2021-06-17 ENCOUNTER — THERAPY VISIT (OUTPATIENT)
Dept: OCCUPATIONAL THERAPY | Facility: CLINIC | Age: 61
End: 2021-06-17
Payer: COMMERCIAL

## 2021-06-17 DIAGNOSIS — M25.531 RIGHT WRIST PAIN: Primary | ICD-10-CM

## 2021-06-17 PROCEDURE — 97140 MANUAL THERAPY 1/> REGIONS: CPT | Mod: GO | Performed by: OCCUPATIONAL THERAPIST

## 2021-06-17 PROCEDURE — 97110 THERAPEUTIC EXERCISES: CPT | Mod: GO | Performed by: OCCUPATIONAL THERAPIST

## 2021-06-17 NOTE — PROGRESS NOTES
SOAP note information for 6/17/2021.  Please refer to the daily flowsheet for treatment today, total treatment time and time spent performing 1:1 timed codes.       Diagnosis: R DRFx  DOI: 4/16/21  DOS: 4/21/21  Procedure: R DRFx ORIF  Right hand dominant    Per MD: 6/2/2021   The patient may wean out of the splint at this time; progress range of motion as tolerated with hand therapy and may begin strengthening exercises. No lifting greater than a gallon of milk for one month, then progress activity as tolerated. Avoid painful activities. Otherwise no activity restrictions.    Objective:  Pain Level (Scale 0-10)   5/3/2021 5/24/2021     At Rest 1 today Sometimes a little twinge of pain   At worst 2      Pain Description  Date 5/3/2021   Location wrist   Pain Quality Sharp lately   Frequency intermittent     Pain is worst  daytime   Exacerbated by  varies   Relieved by rest   Progression improving     Sensation    WNL throughout all nerve distributions; per patient report    Scar    Sensitivity: mild Quality:  Closed, steristrips removed  Edema mild of the R wrist    ROM    Wrist 5/3/2021 5/3/2021 5/24/2021   6/2/2021   6/9/2021     AROM (PROM) R L R R R   Extension 35 65 28 28 37   Flexion 20 70 18 20 25   RD 15 25 15  19   UD 29 58 25  28   Supination 65 72 78     Pronation 75 80 75         Thumb 5/3/2021 5/3/2021 6/17/2021      AROM (PROM) R L R L   MP /43 /62     IP /37 /65     RABD       PABD       Retropulsion       Kapandji Opposition Scale (0-10/10) 8 9.5 10 10      Finger ROM  WNL    Strength:  Pain-free /Pinch Test    6/17/2021   Trials R L   1   17 42     Lat Pinch  6/17/2021   Trials R L   1   10 16     3 Pt Pinch  6/17/2021   Trials R L   1   8 13

## 2021-07-08 ENCOUNTER — THERAPY VISIT (OUTPATIENT)
Dept: OCCUPATIONAL THERAPY | Facility: CLINIC | Age: 61
End: 2021-07-08
Payer: COMMERCIAL

## 2021-07-08 DIAGNOSIS — M25.531 RIGHT WRIST PAIN: Primary | ICD-10-CM

## 2021-07-08 PROCEDURE — 97140 MANUAL THERAPY 1/> REGIONS: CPT | Mod: GO | Performed by: OCCUPATIONAL THERAPIST

## 2021-07-08 PROCEDURE — 97110 THERAPEUTIC EXERCISES: CPT | Mod: GO | Performed by: OCCUPATIONAL THERAPIST

## 2021-07-08 PROCEDURE — 97535 SELF CARE MNGMENT TRAINING: CPT | Mod: GO | Performed by: OCCUPATIONAL THERAPIST

## 2021-07-08 NOTE — PROGRESS NOTES
Hand Therapy Progress Note  Please refer to the daily flowsheet for treatment today, total treatment time and time spent performing 1:1 timed codes.       Current Date:  7/8/2021    Diagnosis: R DRFx  DOI: 4/16/21  DOS: 4/21/21  Procedure: R DRFx ORIF  Right hand dominant  Post: almost 12 weeks    Per MD: 6/2/2021   The patient may wean out of the splint at this time; progress range of motion as tolerated with hand therapy and may begin strengthening exercises. No lifting greater than a gallon of milk for one month, then progress activity as tolerated. Avoid painful activities. Otherwise no activity restrictions.    Reporting period is 5/3 to 7/8/2021    Subjective:   Subjective changes noted by patient:  Doing well, using it. No pain, sore with overdoing it. Would like to be able to weed better. Has been mowing - going ok.     Functional Outcome Measure:  Upper Extremity Functional Index Score:  SCORE:   Column Totals: /80: 63   (A lower score indicates greater disability.)    Objective:  Pain Level (Scale 0-10)   5/3/2021 5/24/2021   7/8/2021     At Rest 1 today Sometimes a little twinge of pain 0   At worst 2  1     Pain Description  Date 5/3/2021 7/8/2021     Location wrist wrist   Pain Quality Sharp lately    Frequency intermittent   Ache sometimes if she over did anything   Pain is worst  daytime    Exacerbated by  varies    Relieved by rest    Progression improving      Sensation    WNL throughout all nerve distributions; per patient report    Scar    Mobile, flattening    Edema minimal of the R wrist    ROM    Wrist 5/3/2021 5/24/2021   6/2/2021   6/9/2021   7/8/2021     AROM (PROM) L R R R R   Extension 65 28 28 37 49   Flexion 70 18 20 25 39   RD 25 15  19 20   UD 58 25  28 40   Supination 72 78   WNL   Pronation 80 75   WNL       Thumb 5/3/2021 5/3/2021 6/17/2021      AROM (PROM) R L R L   MP /43 /62     IP /37 /65     RABD       PABD       Retropulsion       Kapandji Opposition Scale (0-10/10) 8 9.5 10  10      Finger ROM  WNL    Strength:  Pain-free /Pinch Test    6/17/2021    Trials R L    1   17 42 25     Lat Pinch  6/17/2021 7/8/2021     Trials R L R   1   10 16 11     3 Pt Pinch  6/17/2021    Trials R L    1   8 13 10.5       Assessment:  Response to therapy has been improvement to:  ROM of Wrist:  All Planes  Strength:   and pinch and wrist strength    Overall Assessment:  Patient is ready to progress to more complex exercises.  Patient would benefit from continued therapy to achieve rehab potential  STG/LTG:  STGoals have been reviewed and progress or achievement has occurred;  see goal sheet for details and updates.    Plan:  Frequency/Duration:  Recommend continuing to see patient  2 X a month, once daily  for 2 months  Appropriateness of Rx I have re-evaluated this patient and find that the nature, scope, duration and intensity of the therapy is appropriate for the medical condition of the patient.    Treatment Plan:  Continue treatment plan as outlined in initial evaluation

## 2021-07-12 DIAGNOSIS — S52.571D OTHER CLOSED INTRA-ARTICULAR FRACTURE OF DISTAL END OF RIGHT RADIUS WITH ROUTINE HEALING, SUBSEQUENT ENCOUNTER: Primary | ICD-10-CM

## 2021-07-14 ENCOUNTER — OFFICE VISIT (OUTPATIENT)
Dept: ORTHOPEDICS | Facility: CLINIC | Age: 61
End: 2021-07-14
Payer: COMMERCIAL

## 2021-07-14 ENCOUNTER — THERAPY VISIT (OUTPATIENT)
Dept: OCCUPATIONAL THERAPY | Facility: CLINIC | Age: 61
End: 2021-07-14
Payer: COMMERCIAL

## 2021-07-14 ENCOUNTER — ANCILLARY PROCEDURE (OUTPATIENT)
Dept: GENERAL RADIOLOGY | Facility: CLINIC | Age: 61
End: 2021-07-14
Attending: ORTHOPAEDIC SURGERY
Payer: COMMERCIAL

## 2021-07-14 DIAGNOSIS — S52.571D OTHER CLOSED INTRA-ARTICULAR FRACTURE OF DISTAL END OF RIGHT RADIUS WITH ROUTINE HEALING, SUBSEQUENT ENCOUNTER: ICD-10-CM

## 2021-07-14 DIAGNOSIS — M25.531 RIGHT WRIST PAIN: Primary | ICD-10-CM

## 2021-07-14 DIAGNOSIS — S52.571D OTHER CLOSED INTRA-ARTICULAR FRACTURE OF DISTAL END OF RIGHT RADIUS WITH ROUTINE HEALING, SUBSEQUENT ENCOUNTER: Primary | ICD-10-CM

## 2021-07-14 PROCEDURE — 97110 THERAPEUTIC EXERCISES: CPT | Mod: GO | Performed by: OCCUPATIONAL THERAPIST

## 2021-07-14 PROCEDURE — 73110 X-RAY EXAM OF WRIST: CPT | Mod: RT | Performed by: RADIOLOGY

## 2021-07-14 PROCEDURE — 99024 POSTOP FOLLOW-UP VISIT: CPT | Performed by: ORTHOPAEDIC SURGERY

## 2021-07-14 PROCEDURE — 97140 MANUAL THERAPY 1/> REGIONS: CPT | Mod: GO | Performed by: OCCUPATIONAL THERAPIST

## 2021-07-14 PROCEDURE — 97112 NEUROMUSCULAR REEDUCATION: CPT | Mod: GO | Performed by: OCCUPATIONAL THERAPIST

## 2021-07-14 NOTE — NURSING NOTE
Reason For Visit:   Chief Complaint   Patient presents with     RECHECK     DOS 4/21/21 ORIF right distal radius fx       Primary MD: No primary care provider on file.    Age: 60 year old    ?  No      There were no vitals taken for this visit.      Pain Assessment  Patient Currently in Pain: Denies               QuickDASH Assessment  No flowsheet data found.       Current Outpatient Medications   Medication Sig Dispense Refill     acetaminophen (TYLENOL) 325 MG tablet Take 2 tablets (650 mg) by mouth every 4 hours as needed for mild pain 50 tablet 0     oxyCODONE (ROXICODONE) 5 MG tablet Take 1-2 tablets (5-10 mg) by mouth every 4 hours as needed for moderate to severe pain (Patient not taking: Reported on 5/3/2021) 6 tablet 0       Allergies   Allergen Reactions     Penicillins Mike Gonzales, ATC

## 2021-07-14 NOTE — PROGRESS NOTES
Follow-up ORIF right distal radius fracture.  She is doing really well.  Back to most of her activities.  No numbness or tingling.  Minimal pain now.  Nearly complete with her therapy.  Wears a small over-the-counter splint intermittently but less so as time is going on.    The past medical history was reviewed and documented in the chart.  This includes medications, surgeries, social history as well as review of systems.    Physical examination of the right wrist demonstrates 60 degrees of flexion, 60 degrees of extension and full rotation.  The surgical incision on the volar side of her wrist is nicely healed.  No signs of infection.  Negative Tinel's, carpal tunnel compression test.  No ulnar-sided wrist pain.  No significant radial sided wrist pain.  The plate is not prominent.  Bilaterally, no motor, no sensory deficits are noted.  No significant atrophy.  There is brisk capillary refill in all digits and a palpable radial pulse.  No overlying skin changes noted.    X-rays are taken of the right wrist, AP, lateral, oblique.  The fracture is healed nicely.  The hardware is in the appropriate position.  Some STT, thumb CMC joint osteoarthritis noted.    Impression: Status post ORIF right distal radius fracture    Plan: We discussed return to normal activities.  She can wear the splint as needed.  We also discussed fragility fractures.  She is not had a bone density scan.  She does not have a primary physician either.  I encouraged both.  We can help her arrange that if needed.  Otherwise, we will plan on seeing her back in the clinic as needed.  She does have my contact information.

## 2021-07-14 NOTE — LETTER
7/14/2021         RE: Paulette Abbott  [id number]  [facility] [address]        Dear Colleague,    Thank you for referring your patient, Paulette Abbott, to the [facility] [place]. Please see a copy of my visit note below.    Follow-up ORIF right distal radius fracture.  She is doing really well.  Back to most of her activities.  No numbness or tingling.  Minimal pain now.  Nearly complete with her therapy.  Wears a small over-the-counter splint intermittently but less so as time is going on.    The past medical history was reviewed and documented in the chart.  This includes medications, surgeries, social history as well as review of systems.    Physical examination of the right wrist demonstrates 60 degrees of flexion, 60 degrees of extension and full rotation.  The surgical incision on the volar side of her wrist is nicely healed.  No signs of infection.  Negative Tinel's, carpal tunnel compression test.  No ulnar-sided wrist pain.  No significant radial sided wrist pain.  The plate is not prominent.  Bilaterally, no motor, no sensory deficits are noted.  No significant atrophy.  There is brisk capillary refill in all digits and a palpable radial pulse.  No overlying skin changes noted.    X-rays are taken of the right wrist, AP, lateral, oblique.  The fracture is healed nicely.  The hardware is in the appropriate position.  Some STT, thumb CMC joint osteoarthritis noted.    Impression: Status post ORIF right distal radius fracture    Plan: We discussed return to normal activities.  She can wear the splint as needed.  We also discussed fragility fractures.  She is not had a bone density scan.  She does not have a primary physician either.  I encouraged both.  We can help her arrange that if needed.  Otherwise, we will plan on seeing her back in the clinic as needed.  She does have my contact information.    Edu Estrella MD

## 2021-07-14 NOTE — PROGRESS NOTES
Hand Therapy Discharge Note  Please refer to the daily flowsheet for treatment today, total treatment time and time spent performing 1:1 timed codes.       Current Date:  7/8/2021    Diagnosis: R DRFx  DOI: 4/16/21  DOS: 4/21/21  Procedure: R DRFx ORIF  Right hand dominant  Post: 12 weeks    Subjective:   Doing okay, about the same, holding back when appropriate. Gradually getting better.    Objective:  Pain Level (Scale 0-10)   5/3/2021 5/24/2021   7/8/2021      At Rest 1 today Sometimes a little twinge of pain 0    At worst 2  1      Pain Description  Date 5/3/2021 7/8/2021     Location wrist wrist   Pain Quality Sharp lately    Frequency intermittent   Ache sometimes if she over did anything   Pain is worst  daytime    Exacerbated by  varies    Relieved by rest    Progression improving      Sensation    WNL throughout all nerve distributions; per patient report    Scar    Mobile, flattening    Edema minimal of the R wrist    ROM    Wrist 5/3/2021 5/24/2021   6/2/2021   6/9/2021   7/8/2021   7/14/2021     AROM (PROM) L R R R R R   Extension 65 28 28 37 49 45   Flexion 70 18 20 25 39 40   RD 25 15  19 20 20   UD 58 25  28 40 38   Supination 72 78   WNL    Pronation 80 75   WNL        Thumb 5/3/2021 5/3/2021 6/17/2021      AROM (PROM) R L R L   MP /43 /62     IP /37 /65     RABD       PABD       Retropulsion       Kapandji Opposition Scale (0-10/10) 8 9.5 10 10      Finger ROM  WNL    Strength:  Pain-free /Pinch Test    6/17/2021 7/8/21 7/14/2021     Trials R L R R   1   17 42 25 26     Lat Pinch  6/17/2021 7/8/2021 7/14/2021     Trials R L R R   1   10 16 11 10     3 Pt Pinch  6/17/2021 7/18/2021 7/14/2021   Trials R L R R   1   8 13 10.5 7.5     Assessment:  Response to therapy has been improvement to:  ROM of Wrist:  All Planes  Strength:   and pinch and wrist strength  Appropriateness of Rx I have re-evaluated this patient and find that the nature, scope, duration and intensity of the therapy is  appropriate for the medical condition of the patient.  Overall Assessment:  Patient is progressing well.  Patient is ready to be discharged from therapy and continue their home treatment program.  STG/LTG:  See goal sheet for details and updates.    Plan:  Frequency/Duration:  Discharge from Hand Therapy; continue home program.

## 2021-10-24 ENCOUNTER — HEALTH MAINTENANCE LETTER (OUTPATIENT)
Age: 61
End: 2021-10-24

## 2021-10-25 ENCOUNTER — OFFICE VISIT (OUTPATIENT)
Dept: OPTOMETRY | Facility: CLINIC | Age: 61
End: 2021-10-25
Payer: COMMERCIAL

## 2021-10-25 DIAGNOSIS — Z86.69 H/O RETINAL DETACHMENT: ICD-10-CM

## 2021-10-25 DIAGNOSIS — H40.003 GLAUCOMA SUSPECT, BILATERAL: ICD-10-CM

## 2021-10-25 DIAGNOSIS — H25.13 NUCLEAR AGE-RELATED CATARACT, BOTH EYES: ICD-10-CM

## 2021-10-25 DIAGNOSIS — H52.13 SEVERE MYOPIA OF BOTH EYES: ICD-10-CM

## 2021-10-25 DIAGNOSIS — H40.053 BORDERLINE GLAUCOMA WITH OCULAR HYPERTENSION, BILATERAL: Primary | ICD-10-CM

## 2021-10-25 RX ORDER — LATANOPROST 50 UG/ML
1 SOLUTION/ DROPS OPHTHALMIC AT BEDTIME
Qty: 2.5 ML | Refills: 4 | Status: SHIPPED | OUTPATIENT
Start: 2021-10-25 | End: 2023-03-20

## 2021-10-25 ASSESSMENT — REFRACTION_WEARINGRX
OD_AXIS: 080
OD_SPHERE: -10.00
OS_SPHERE: -12.00
OD_CYLINDER: +1.00
OS_CYLINDER: SPHERE

## 2021-10-25 ASSESSMENT — EXTERNAL EXAM - RIGHT EYE: OD_EXAM: NORMAL

## 2021-10-25 ASSESSMENT — VISUAL ACUITY
METHOD: SNELLEN - LINEAR
OD_CC: 1M
CORRECTION_TYPE: CONTACTS
OS_CC: 1M
OS_CC: 20/70
OD_CC: 20/60

## 2021-10-25 ASSESSMENT — REFRACTION_MANIFEST
OD_AXIS: 090
OS_AXIS: 090
OS_SPHERE: -13.00
OS_CYLINDER: +1.00
OD_SPHERE: -11.50
OD_CYLINDER: +1.50

## 2021-10-25 ASSESSMENT — CONF VISUAL FIELD
OS_NORMAL: 1
OD_NORMAL: 1

## 2021-10-25 ASSESSMENT — CUP TO DISC RATIO
OS_RATIO: 0.60
OD_RATIO: 0.60

## 2021-10-25 ASSESSMENT — SLIT LAMP EXAM - LIDS
COMMENTS: NORMAL
COMMENTS: NORMAL

## 2021-10-25 ASSESSMENT — TONOMETRY
OD_IOP_MMHG: 27
OS_IOP_MMHG: 21
IOP_METHOD: ICARE

## 2021-10-25 ASSESSMENT — EXTERNAL EXAM - LEFT EYE: OS_EXAM: NORMAL

## 2021-10-25 NOTE — PROGRESS NOTES
A/P  1.) Borderline glaucoma with ocular hypertension  -Baseline IOP today 27/21  -Family Hx glaucoma (dad). 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 Reviewed this may be presentation of myopic nerves but will treat IOP and monitor for progression  -Start latanoprost at bedtime OU     2.) Cataracts OU  -Likely visually significant right eye>left eye  -BCVA 20/30 both eyes, reviewed with pt and we will continue to monitor at this time  -She is somewhat bothered by glare/nighttime vision but manageable for now    3.) H/o RD left eye  -s/p scleral buckle, was removed 10 years ago  -CR scarring, no new holes/tears/detachments  -Reviewed flashes/floaters and need for stat eye eval should they occur    4.) High Myopia/Presbyopia OU  -Currently in segmented RGP bifocals OU with decreased vision  -BCVA with spec Rx 20/30 both eyes, would refit RGP's empirically (no previous RGP records)  -Will start with translating design but we can return to segmented GP bifocal if not successful     Order new RGP's and mail direct. F/u prn with lens concerns. RTC 6 months for IOP/OCT/VF glaucoma check    Contact Lens Billing  V-Code:  GP bifocal  Final Contact Lens Rx       Brand Base Curve Diameter Sphere Add Lens    Right Reclaim HD 7.95 [phone] -[phone] +3.00 [place] ES Blue, dot     Left Reclaim HD 7.95 9.5 -10.50 +3.00 [place] ES Blue         # of units: 2  Price per Unit: $150    These are for cosmetic contact lenses.    Encounter Diagnoses   Name Primary?     Glaucoma suspect, bilateral      Borderline glaucoma with ocular hypertension, bilateral Yes     Nuclear age-related cataract, both  eyes      Severe myopia of both eyes      H/O retinal detachment

## 2021-11-16 PROBLEM — M25.531 RIGHT WRIST PAIN: Status: RESOLVED | Noted: 2021-05-03 | Resolved: 2021-11-16

## 2021-11-16 NOTE — PROGRESS NOTES
Discharge Summary - Hand Therapy    11/16/2021    Patient did not return to therapy.    This episode of care will be resolved.  Plan: Discharge from hand therapy.    Charmaine Gomez MS, OTR/L, CHT

## 2021-12-15 ENCOUNTER — IMMUNIZATION (OUTPATIENT)
Dept: NURSING | Facility: CLINIC | Age: 61
End: 2021-12-15
Payer: COMMERCIAL

## 2021-12-15 PROCEDURE — 91300 PR COVID VAC PFIZER DIL RECON 30 MCG/0.3 ML IM: CPT

## 2021-12-15 PROCEDURE — 0004A PR COVID VAC PFIZER DIL RECON 30 MCG/0.3 ML IM: CPT

## 2022-05-09 ENCOUNTER — IMMUNIZATION (OUTPATIENT)
Dept: NURSING | Facility: CLINIC | Age: 62
End: 2022-05-09
Payer: COMMERCIAL

## 2022-05-09 PROCEDURE — 0054A COVID-19,PF,PFIZER (12+ YRS): CPT

## 2022-05-09 PROCEDURE — 91305 COVID-19,PF,PFIZER (12+ YRS): CPT

## 2022-06-05 ENCOUNTER — HEALTH MAINTENANCE LETTER (OUTPATIENT)
Age: 62
End: 2022-06-05

## 2022-10-15 ENCOUNTER — HEALTH MAINTENANCE LETTER (OUTPATIENT)
Age: 62
End: 2022-10-15

## 2023-01-19 ENCOUNTER — TELEPHONE (OUTPATIENT)
Dept: OPHTHALMOLOGY | Facility: CLINIC | Age: 63
End: 2023-01-19
Payer: COMMERCIAL

## 2023-01-19 NOTE — TELEPHONE ENCOUNTER
Scar Paulette SERGEI [phone]       Left eye with some redness, irritation and yellowish discharge times one day    No noted light sensitivities/vision changes    Pt does wear hard contact lenses and did leave in overnight unintentionally    Scheduled tomorrow in acute clinic at 0730     Pt aware of date/time/location and reviewed to stay out of contact lens and recommended preservative free artificial tears frequently    Pt seemed comfortable with scheduling/plan    Gregory Ramírez RN 2:02 PM 01/19/23            [facility]    Phone Message    May a detailed message be left on voicemail: yes     Reason for Call: Symptoms or Concerns     If patient has red-flag symptoms, warm transfer to triage line    Current symptom or concern: Left Eye pain.  Patient has an appt Monday, but would like to be seen sooner.      Symptoms have been present for:  2 day(s)    Has patient previously been seen for this? No    By :     Date:     Are there any new or worsening symptoms? No      Action Taken: Message routed to:  [facility] ([facility] Ophthalmology    Travel Screening: Not Applicable

## 2023-01-20 ENCOUNTER — OFFICE VISIT (OUTPATIENT)
Dept: OPHTHALMOLOGY | Facility: CLINIC | Age: 63
End: 2023-01-20
Attending: STUDENT IN AN ORGANIZED HEALTH CARE EDUCATION/TRAINING PROGRAM
Payer: COMMERCIAL

## 2023-01-20 DIAGNOSIS — H01.021 SQUAMOUS BLEPHARITIS OF UPPER EYELIDS OF BOTH EYES: ICD-10-CM

## 2023-01-20 DIAGNOSIS — H16.012 CENTRAL CORNEAL ULCER OF LEFT EYE: Primary | ICD-10-CM

## 2023-01-20 DIAGNOSIS — H01.024 SQUAMOUS BLEPHARITIS OF UPPER EYELIDS OF BOTH EYES: ICD-10-CM

## 2023-01-20 LAB
GRAM STAIN RESULT: NORMAL
GRAM STAIN RESULT: NORMAL
KOH PREPARATION: NORMAL
KOH PREPARATION: NORMAL

## 2023-01-20 PROCEDURE — 87529 HSV DNA AMP PROBE: CPT | Mod: 59 | Performed by: STUDENT IN AN ORGANIZED HEALTH CARE EDUCATION/TRAINING PROGRAM

## 2023-01-20 PROCEDURE — 87205 SMEAR GRAM STAIN: CPT | Performed by: STUDENT IN AN ORGANIZED HEALTH CARE EDUCATION/TRAINING PROGRAM

## 2023-01-20 PROCEDURE — 87075 CULTR BACTERIA EXCEPT BLOOD: CPT | Performed by: STUDENT IN AN ORGANIZED HEALTH CARE EDUCATION/TRAINING PROGRAM

## 2023-01-20 PROCEDURE — 92285 EXTERNAL OCULAR PHOTOGRAPHY: CPT | Performed by: STUDENT IN AN ORGANIZED HEALTH CARE EDUCATION/TRAINING PROGRAM

## 2023-01-20 PROCEDURE — 99204 OFFICE O/P NEW MOD 45 MIN: CPT | Mod: GC | Performed by: OPHTHALMOLOGY

## 2023-01-20 PROCEDURE — 92285 EXTERNAL OCULAR PHOTOGRAPHY: CPT | Mod: 26 | Performed by: OPHTHALMOLOGY

## 2023-01-20 PROCEDURE — G0463 HOSPITAL OUTPT CLINIC VISIT: HCPCS | Mod: 25

## 2023-01-20 PROCEDURE — 87102 FUNGUS ISOLATION CULTURE: CPT | Performed by: STUDENT IN AN ORGANIZED HEALTH CARE EDUCATION/TRAINING PROGRAM

## 2023-01-20 PROCEDURE — 87070 CULTURE OTHR SPECIMN AEROBIC: CPT | Performed by: STUDENT IN AN ORGANIZED HEALTH CARE EDUCATION/TRAINING PROGRAM

## 2023-01-20 PROCEDURE — 87210 SMEAR WET MOUNT SALINE/INK: CPT | Performed by: STUDENT IN AN ORGANIZED HEALTH CARE EDUCATION/TRAINING PROGRAM

## 2023-01-20 ASSESSMENT — SLIT LAMP EXAM - LIDS: COMMENTS: MGD, BLEPHARITIS

## 2023-01-20 ASSESSMENT — CONF VISUAL FIELD
OS_INFERIOR_NASAL_RESTRICTION: 0
OD_SUPERIOR_TEMPORAL_RESTRICTION: 0
OS_NORMAL: 1
OD_INFERIOR_TEMPORAL_RESTRICTION: 0
OS_INFERIOR_TEMPORAL_RESTRICTION: 0
OD_INFERIOR_NASAL_RESTRICTION: 0
OS_SUPERIOR_NASAL_RESTRICTION: 0
OD_NORMAL: 1
OD_SUPERIOR_NASAL_RESTRICTION: 0
OS_SUPERIOR_TEMPORAL_RESTRICTION: 0
METHOD: COUNTING FINGERS

## 2023-01-20 ASSESSMENT — TONOMETRY
OS_IOP_MMHG: 14
OS_IOP_MMHG: 12
IOP_METHOD: TONOPEN
IOP_METHOD: TONOPEN
OD_IOP_MMHG: 22
OD_IOP_MMHG: 21

## 2023-01-20 ASSESSMENT — VISUAL ACUITY
OS_PH_CC: 20/60
OS_PH_CC+: +2
METHOD: SNELLEN - LINEAR
CORRECTION_TYPE: GLASSES
OS_CC: 20/80
OD_CC: 20/70
OD_PH_CC: 20/50

## 2023-01-20 ASSESSMENT — CUP TO DISC RATIO
OD_RATIO: 0.60
OS_RATIO: 0.60

## 2023-01-20 ASSESSMENT — EXTERNAL EXAM - RIGHT EYE: OD_EXAM: NORMAL

## 2023-01-20 ASSESSMENT — REFRACTION_WEARINGRX
OD_SPHERE: -10.00
OD_AXIS: 080
OS_SPHERE: -12.00
OD_CYLINDER: +1.00
OS_CYLINDER: SPHERE

## 2023-01-20 ASSESSMENT — EXTERNAL EXAM - LEFT EYE: OS_EXAM: NORMAL

## 2023-01-20 NOTE — NURSING NOTE
Chief Complaints and History of Present Illnesses   Patient presents with     Redness/discharge Of Eye     Chief Complaint(s) and History of Present Illness(es)     Redness/discharge Of Eye            Laterality: left eye          Comments    Redness and mucous like discharge from LE x2days. Pt uncertain if vision has changed. Pt usually uses RGP's but has been out of them for the past 2 days. Pt is wearing her old glasses that are bout 15 years old today.  Occasional flashes and floaters in each eye that come and go. Pt notes having a RD in her Left eye about 20 years ago.  Pt also notes that she was taking Latanoprost at bedtime at bedtime, but has not used the drops for about 2 months.    HORACIO Mcqueen January 20, 2023 7:24 AM

## 2023-01-20 NOTE — PATIENT INSTRUCTIONS
Today we found an ulcer in your left cornea. For the next 24 hours, please use your antibiotic drops (vancomycin and tobramcyin) every 1 hour day and night. After that use them every 2 hours. We will see you on Monday. Do not use contact lens until it is approved by an eye doctor.    Start doing twice daily eyelid scrubs with baby shampoo

## 2023-01-20 NOTE — PROGRESS NOTES
Ophthalmology Acute Clinic     Chief Complaint(s) and History of Present Illness(es)    No visit information to display           HPI:   Paulette Abbott is a 62 year old female who presents for 2 days of left foreign body sensation and eyelid swelling. She reports that his has improved over the last 2 days. She uses rigid contact lens which she usually takes out nightly. She uses the same contact lens for multiple years and these are a year old. She fell asleep in the contact lens the night before symptoms started.      Past Ocular history:   - Glasses: myopia  - Contact lens wear: yes  - Ocular Surgical History: left retinal detachment  - Current Eye drops: PFAT    PMH:   Past Medical History:   Diagnosis Date     Hypertension      Retinal detachment        FH: Father with glaucoma     Review of systems for the eyes was negative other than the pertinent positives/negatives listed in the HPI.        Assessment & Plan      Paulette Abbott is a 62 year old female with the following diagnoses:   1. Central corneal ulcer of left eye    2. Squamous blepharitis of upper eyelids of both eyes         Corneal ulcer, left eye  -History of long-term rigid CTL wear bilaterally. Uses CTL solution to clean. Usually takes them out nightly but prior to developing symptoms she left them in.  -Visual acuity 20/50 and 20/60, stable from 2021. She previously improved to 20/30 with RGP  -Slit lamp exam notable for epithelial defect and underlying whorling stromal haze. Concern for HSV given superior bulbous configuration  -Corneal ulcer cultures obtained and pending:  -HSV PCR  -Aerobic and anaerobic bacterial cultures  -Fungus culture  -Acanthamoeba culture/PCR  -Gram stain  -KOH prep  -Vancomycin 25mg/ml eye drops Q1H, right eye for 24 hours then decrease to Q2H  -Tobramycin 15mg/ml eye drops Q1H, right eye for 24 hours then decrease to Q2H  -Contact lens vacation  -No eye rubbing  -No strenuous activity/heavy  lifting/bending    Blepharitis  -Lid hygiene twice daily with baby shampoo    Patient disposition:   Return in about 3 days (around 1/23/2023) for Follow up with cornea or acute clinic.    Patient seen with Dr. Coats    Thank you for entrusting us with your care  Chelo Sorenson MD  Resident Physician, PGY-2  Department of Ophthalmology  01/20/23 7:23 AM    Attending Physician Attestation:  Complete documentation of historical and exam elements from today's encounter can be found in the full encounter summary report (not reduplicated in this progress note).  I personally obtained the chief complaint(s) and history of present illness.  I confirmed and edited as necessary the review of systems, past medical/surgical history, family history, social history, and examination findings as documented by others; and I examined the patient myself.  I personally reviewed the relevant tests, images, and reports as documented above.  I formulated and edited as necessary the assessment and plan and discussed the findings and management plan with the patient and family. - El Coats MD

## 2023-01-21 LAB
HSV1 DNA SPEC QL NAA+PROBE: NOT DETECTED
HSV2 DNA SPEC QL NAA+PROBE: NOT DETECTED

## 2023-01-23 ENCOUNTER — OFFICE VISIT (OUTPATIENT)
Dept: OPHTHALMOLOGY | Facility: CLINIC | Age: 63
End: 2023-01-23
Attending: OPHTHALMOLOGY
Payer: COMMERCIAL

## 2023-01-23 DIAGNOSIS — L03.213 PERIORBITAL CELLULITIS OF LEFT EYE: Primary | ICD-10-CM

## 2023-01-23 DIAGNOSIS — H16.012 CENTRAL CORNEAL ULCER OF LEFT EYE: ICD-10-CM

## 2023-01-23 PROCEDURE — 99214 OFFICE O/P EST MOD 30 MIN: CPT | Mod: GC | Performed by: OPHTHALMOLOGY

## 2023-01-23 PROCEDURE — G0463 HOSPITAL OUTPT CLINIC VISIT: HCPCS

## 2023-01-23 RX ORDER — LEVOFLOXACIN 750 MG/1
750 TABLET, FILM COATED ORAL DAILY
Qty: 14 TABLET | Refills: 0 | Status: SHIPPED | OUTPATIENT
Start: 2023-01-23 | End: 2023-02-06

## 2023-01-23 ASSESSMENT — VISUAL ACUITY
OS_CC+: -1
OD_PH_CC: 20/50
OS_CC: 20/100
METHOD: SNELLEN - LINEAR
OD_CC+: -2
OD_CC: 20/60

## 2023-01-23 ASSESSMENT — REFRACTION_WEARINGRX
OD_SPHERE: -10.00
OD_CYLINDER: +1.00
OS_SPHERE: -12.00
OD_AXIS: 080
OS_CYLINDER: SPHERE

## 2023-01-23 ASSESSMENT — TONOMETRY
IOP_METHOD: ICARE
OD_IOP_MMHG: 23
OS_IOP_MMHG: 21

## 2023-01-23 ASSESSMENT — CONF VISUAL FIELD
OS_SUPERIOR_NASAL_RESTRICTION: 0
OS_NORMAL: 1
OS_INFERIOR_TEMPORAL_RESTRICTION: 0
OD_INFERIOR_TEMPORAL_RESTRICTION: 0
METHOD: COUNTING FINGERS
OD_INFERIOR_NASAL_RESTRICTION: 0
OD_SUPERIOR_TEMPORAL_RESTRICTION: 0
OS_INFERIOR_NASAL_RESTRICTION: 0
OS_SUPERIOR_TEMPORAL_RESTRICTION: 0
OD_SUPERIOR_NASAL_RESTRICTION: 0
OD_NORMAL: 1

## 2023-01-23 ASSESSMENT — SLIT LAMP EXAM - LIDS: COMMENTS: NORMAL

## 2023-01-23 ASSESSMENT — EXTERNAL EXAM - LEFT EYE: OS_EXAM: NORMAL

## 2023-01-23 ASSESSMENT — EXTERNAL EXAM - RIGHT EYE: OD_EXAM: NORMAL

## 2023-01-23 NOTE — PROGRESS NOTES
Chief complaint   Corneal ulcer follow-up       Referring Provider: Dr. Delta CHUNG    Paulette Abbott 62 year old female with a history of left retinal detachment, hard contact lens use and recent corneal ulceration of the left eye presents for follow-up. Patient was seen on 1/20/23 in acute clinic and noted to have 2 days of left eye pain/eyelid swelling. She was noted to have left corneal ulcer in the left eye with particular concern for HSV. Cultures for bacteria, fungus, acathamoeba and HSV PCR were collected at that time. She was also started on fortified antibiotic drops.     Past ocular history   Prior eye surgery/laser/Trauma: retinal detachment repair >20 years ago  CTL wearer: hard contact lens  Glasses : -  Family Hx of eye disease:-    PMH     Past Medical History:   Diagnosis Date     Hypertension      Retinal detachment        PSH     Past Surgical History:   Procedure Laterality Date     EYE SURGERY       OPEN REDUCTION INTERNAL FIXATION WRIST Right 4/21/2021    Procedure: OPEN REDUCTION INTERNAL FIXATION, FRACTURE, WRIST;  Surgeon: Edu Estrella MD;  Location: [facility] OR     RETINAL REATTACHMENT         Meds     Current Outpatient Medications   Medication     latanoprost (XALATAN) 0.005 % ophthalmic solution     tobramycin (TOBREX) 15 mg/mL compounded ophthalmic solution     vancomycin (VANCOCIN) 25 mg/mL in hypromellose 0.3% cmpd ophthalmic solution     No current facility-administered medications for this visit.       Labs   KOH/Gram stain: negative to date  Anaerobic culture: NGTD  Aerobic Culture: NGTD  Fungal Culture: NGTD  HSV PCR: Negative    Imaging   -    Drops Currently Taking   Fortified vancomycin drops Q2H left eye  Fortified tobramycin drops Q2H left eye     Assessment/Plan   # Corneal ulcer, left eye  -History of long-term rigid CTL wear bilaterally. Uses CTL solution to clean. Usually takes them out nightly but prior to developing symptoms she left them in.  -stable VA  today, improvement of the density of the corneal haze next to the infected area    Plan:  Continue same treatments  Add artificial tears  If no improvement next visit I would consider antiviral.      #history of recurrent chalazia each eye  Today , 2 lower lid chalazia left eye  Suspicion of periorbital cellulitis with the active corneal infection  Better start oral treatment antibiotics    Plan:  Prescribe levofloxacin oral pills today  Patient has allergy to penicillin    #history of retinal detachment right eye with buckle      Follow up:  Friday w/ V,T,  at next visit, call if problems sooner to clinic.      Attending Physician Attestation:  Complete documentation of historical and exam elements from today's encounter can be found in the full encounter summary report (not reduplicated in this progress note).  I personally obtained the chief complaint(s) and history of present illness.  I confirmed and edited as necessary the review of systems, past medical/surgical history, family history, social history, and examination findings as documented by others; and I examined the patient myself.  I personally reviewed the relevant tests, images, and reports as documented above.  I formulated and edited as necessary the assessment and plan and discussed the findings and management plan with the patient and family. - Vikki Chopra MD

## 2023-01-23 NOTE — NURSING NOTE
Chief Complaints and History of Present Illnesses   Patient presents with     Follow Up     Central corneal ulcer of left eye     Chief Complaint(s) and History of Present Illness(es)     Follow Up            Comments: Central corneal ulcer of left eye          Comments    Pt states no change in VA since last visit  Pt states eye pain left eye 8/10 on the pain scale  Some AM crusting and discharge Left eye   No light sensitivity or headache    Beatriz Gutierrez COT 9:45 AM January 23, 2023

## 2023-01-27 ENCOUNTER — OFFICE VISIT (OUTPATIENT)
Dept: OPHTHALMOLOGY | Facility: CLINIC | Age: 63
End: 2023-01-27
Attending: OPHTHALMOLOGY
Payer: COMMERCIAL

## 2023-01-27 ENCOUNTER — LAB (OUTPATIENT)
Dept: LAB | Facility: CLINIC | Age: 63
End: 2023-01-27
Payer: COMMERCIAL

## 2023-01-27 DIAGNOSIS — H16.012 CENTRAL CORNEAL ULCER OF LEFT EYE: Primary | ICD-10-CM

## 2023-01-27 DIAGNOSIS — L03.213 PERIORBITAL CELLULITIS OF LEFT EYE: ICD-10-CM

## 2023-01-27 DIAGNOSIS — H16.012 CENTRAL CORNEAL ULCER OF LEFT EYE: ICD-10-CM

## 2023-01-27 DIAGNOSIS — H15.042 SCLERITIS OF LEFT EYE WITH CORNEAL INVOLVEMENT: ICD-10-CM

## 2023-01-27 LAB
ALBUMIN SERPL BCG-MCNC: 4.5 G/DL (ref 3.5–5.2)
ALP SERPL-CCNC: 70 U/L (ref 35–104)
ALT SERPL W P-5'-P-CCNC: 10 U/L (ref 10–35)
ANION GAP SERPL CALCULATED.3IONS-SCNC: 9 MMOL/L (ref 7–15)
AST SERPL W P-5'-P-CCNC: 15 U/L (ref 10–35)
BACTERIA TISS BX CULT: NO GROWTH
BACTERIA TISS BX CULT: NORMAL
BASOPHILS # BLD AUTO: 0 10E3/UL (ref 0–0.2)
BASOPHILS NFR BLD AUTO: 1 %
BILIRUB SERPL-MCNC: 0.5 MG/DL
BUN SERPL-MCNC: 9.8 MG/DL (ref 8–23)
CALCIUM SERPL-MCNC: 9.5 MG/DL (ref 8.8–10.2)
CHLORIDE SERPL-SCNC: 106 MMOL/L (ref 98–107)
CREAT SERPL-MCNC: 0.78 MG/DL (ref 0.51–0.95)
CRP SERPL-MCNC: <3 MG/L
DEPRECATED HCO3 PLAS-SCNC: 27 MMOL/L (ref 22–29)
EOSINOPHIL # BLD AUTO: 0.1 10E3/UL (ref 0–0.7)
EOSINOPHIL NFR BLD AUTO: 1 %
ERYTHROCYTE [DISTWIDTH] IN BLOOD BY AUTOMATED COUNT: 11.8 % (ref 10–15)
GFR SERPL CREATININE-BSD FRML MDRD: 85 ML/MIN/1.73M2
GLUCOSE SERPL-MCNC: 102 MG/DL (ref 70–99)
HCT VFR BLD AUTO: 42 % (ref 35–47)
HGB BLD-MCNC: 13.8 G/DL (ref 11.7–15.7)
IMM GRANULOCYTES # BLD: 0 10E3/UL
IMM GRANULOCYTES NFR BLD: 0 %
LYMPHOCYTES # BLD AUTO: 1.8 10E3/UL (ref 0.8–5.3)
LYMPHOCYTES NFR BLD AUTO: 27 %
MCH RBC QN AUTO: 28.9 PG (ref 26.5–33)
MCHC RBC AUTO-ENTMCNC: 32.9 G/DL (ref 31.5–36.5)
MCV RBC AUTO: 88 FL (ref 78–100)
MONOCYTES # BLD AUTO: 0.4 10E3/UL (ref 0–1.3)
MONOCYTES NFR BLD AUTO: 6 %
NEUTROPHILS # BLD AUTO: 4.2 10E3/UL (ref 1.6–8.3)
NEUTROPHILS NFR BLD AUTO: 65 %
NRBC # BLD AUTO: 0 10E3/UL
NRBC BLD AUTO-RTO: 0 /100
PLATELET # BLD AUTO: 153 10E3/UL (ref 150–450)
POTASSIUM SERPL-SCNC: 3.9 MMOL/L (ref 3.4–5.3)
PROT SERPL-MCNC: 7.2 G/DL (ref 6.4–8.3)
RBC # BLD AUTO: 4.78 10E6/UL (ref 3.8–5.2)
SODIUM SERPL-SCNC: 142 MMOL/L (ref 136–145)
WBC # BLD AUTO: 6.5 10E3/UL (ref 4–11)

## 2023-01-27 PROCEDURE — G0463 HOSPITAL OUTPT CLINIC VISIT: HCPCS | Mod: 25

## 2023-01-27 PROCEDURE — 85549 MURAMIDASE: CPT | Mod: 90 | Performed by: PATHOLOGY

## 2023-01-27 PROCEDURE — 86038 ANTINUCLEAR ANTIBODIES: CPT | Mod: 90 | Performed by: PATHOLOGY

## 2023-01-27 PROCEDURE — 85025 COMPLETE CBC W/AUTO DIFF WBC: CPT | Performed by: PATHOLOGY

## 2023-01-27 PROCEDURE — 99214 OFFICE O/P EST MOD 30 MIN: CPT | Mod: GC | Performed by: OPHTHALMOLOGY

## 2023-01-27 PROCEDURE — 99000 SPECIMEN HANDLING OFFICE-LAB: CPT | Performed by: PATHOLOGY

## 2023-01-27 PROCEDURE — 86140 C-REACTIVE PROTEIN: CPT | Performed by: PATHOLOGY

## 2023-01-27 PROCEDURE — 86431 RHEUMATOID FACTOR QUANT: CPT | Mod: 90 | Performed by: PATHOLOGY

## 2023-01-27 PROCEDURE — 86787 VARICELLA-ZOSTER ANTIBODY: CPT | Mod: 90 | Performed by: PATHOLOGY

## 2023-01-27 PROCEDURE — 82164 ANGIOTENSIN I ENZYME TEST: CPT | Mod: 90 | Performed by: PATHOLOGY

## 2023-01-27 PROCEDURE — 76513 OPH US DX ANT SGM US UNI/BI: CPT | Performed by: OPHTHALMOLOGY

## 2023-01-27 PROCEDURE — 86481 TB AG RESPONSE T-CELL SUSP: CPT | Mod: 90 | Performed by: PATHOLOGY

## 2023-01-27 PROCEDURE — 86780 TREPONEMA PALLIDUM: CPT | Mod: 90 | Performed by: PATHOLOGY

## 2023-01-27 PROCEDURE — 86695 HERPES SIMPLEX TYPE 1 TEST: CPT | Mod: 90 | Performed by: PATHOLOGY

## 2023-01-27 PROCEDURE — 36415 COLL VENOUS BLD VENIPUNCTURE: CPT | Performed by: PATHOLOGY

## 2023-01-27 PROCEDURE — 76513 OPH US DX ANT SGM US UNI/BI: CPT | Mod: 26 | Performed by: OPHTHALMOLOGY

## 2023-01-27 PROCEDURE — 80053 COMPREHEN METABOLIC PANEL: CPT | Performed by: PATHOLOGY

## 2023-01-27 PROCEDURE — 86696 HERPES SIMPLEX TYPE 2 TEST: CPT | Mod: 90 | Performed by: PATHOLOGY

## 2023-01-27 RX ORDER — VALACYCLOVIR HYDROCHLORIDE 1 G/1
1000 TABLET, FILM COATED ORAL 3 TIMES DAILY
Qty: 42 TABLET | Refills: 0 | Status: SHIPPED | OUTPATIENT
Start: 2023-01-27 | End: 2023-03-20

## 2023-01-27 ASSESSMENT — CONF VISUAL FIELD
OS_SUPERIOR_NASAL_RESTRICTION: 0
OD_SUPERIOR_NASAL_RESTRICTION: 0
OD_NORMAL: 1
OS_INFERIOR_TEMPORAL_RESTRICTION: 0
OS_NORMAL: 1
OD_INFERIOR_NASAL_RESTRICTION: 0
OS_INFERIOR_NASAL_RESTRICTION: 0
OD_INFERIOR_TEMPORAL_RESTRICTION: 0
OD_SUPERIOR_TEMPORAL_RESTRICTION: 0
METHOD: COUNTING FINGERS
OS_SUPERIOR_TEMPORAL_RESTRICTION: 0

## 2023-01-27 ASSESSMENT — VISUAL ACUITY
OS_PH_CC: 20/80
OD_PH_CC+: -1
OD_CC: 20/70
CORRECTION_TYPE: GLASSES
OS_CC+: +2
METHOD: SNELLEN - LINEAR
OS_CC: 20/100
OD_PH_CC: 20/50

## 2023-01-27 ASSESSMENT — TONOMETRY
IOP_METHOD: ICARE
OS_IOP_MMHG: 19
OD_IOP_MMHG: 23

## 2023-01-27 ASSESSMENT — EXTERNAL EXAM - LEFT EYE: OS_EXAM: NORMAL

## 2023-01-27 ASSESSMENT — SLIT LAMP EXAM - LIDS: COMMENTS: NORMAL

## 2023-01-27 ASSESSMENT — EXTERNAL EXAM - RIGHT EYE: OD_EXAM: NORMAL

## 2023-01-27 NOTE — NURSING NOTE
Chief Complaints and History of Present Illnesses   Patient presents with     Follow Up     Follow up of corneal ulcer left eye.      Chief Complaint(s) and History of Present Illness(es)     Follow Up            Laterality: left eye    Associated symptoms: eye pain, discharge and foreign body sensation    Treatments tried: eye drops    Response to treatment: mild improvement    Pain scale: 4/10    Comments: Follow up of corneal ulcer left eye.          Comments    Eye meds:   Fortified vancomycin drops Q2H left eye  Fortified tobramycin drops Q2H left eye   Levofloxacin PO    Right eye CL wear only, no concerns of vision, wears CL because glasses not working.    Left eye feels like something under left lower eyelid.  Still having matter, redness, watering and 4/10 pain scale.  MAGGY Orourke 1/27/2023 10:42 AM

## 2023-01-27 NOTE — PROGRESS NOTES
Chief complaint   Corneal ulcer follow-up     Referring Provider: Dr. Delta CHUNG    Paulette Abbott 62 year old female with a history of left retinal detachment, hard contact lens use and recent corneal ulceration of the left eye presents for follow-up. Patient was seen on 1/20/23 in acute clinic and noted to have 2 days of left eye pain/eyelid swelling. She was noted to have left corneal ulcer in the left eye with particular concern for HSV. Cultures for bacteria, fungus, acathamoeba and HSV PCR were collected at that time. She was also started on fortified antibiotic drops.     Interval: Reports that her eye is maybe slightly more comfortable, though overall feels stable. Reports chronic floaters, unchanged. No flashes. No significant vision changes.    Past ocular history   Prior eye surgery/laser/Trauma: retinal detachment repair >20 years ago  CTL wearer: hard contact lens  Glasses : -  Family Hx of eye disease:-    PMH     Past Medical History:   Diagnosis Date     Hypertension      Retinal detachment        PSH     Past Surgical History:   Procedure Laterality Date     EYE SURGERY       OPEN REDUCTION INTERNAL FIXATION WRIST Right 4/21/2021    Procedure: OPEN REDUCTION INTERNAL FIXATION, FRACTURE, WRIST;  Surgeon: Edu Estrella MD;  [place] [facility] [place]     RETINAL REATTACHMENT         Meds     Current Outpatient Medications   Medication     latanoprost (XALATAN) 0.005 % ophthalmic solution     levofloxacin (LEVAQUIN) 750 MG tablet     tobramycin (TOBREX) 15 mg/mL compounded ophthalmic solution     vancomycin (VANCOCIN) 25 mg/mL in hypromellose 0.3% cmpd ophthalmic solution     No current facility-administered medications for this visit.       Labs   KOH/Gram stain: negative to date  Anaerobic culture: NGTD  Aerobic Culture: NGTD  Fungal Culture: NGTD  HSV PCR: Negative    Imaging   -    Drops Currently Taking   Fortified vancomycin drops Q2H left eye  Fortified tobramycin drops Q2H left eye    PFATs PRN each eye    Levofloxacin 750mg PO QD    Assessment/Plan   # Corneal ulcer, left eye  - History of long-term rigid CTL wear bilaterally. Uses CTL solution to clean. Usually takes them out nightly but prior to developing symptoms she left them in.  - stable VA today, resolved corneal epithelial defect, improvement of the density of the corneal haze next to the infected area  - new conjunctival/scleral inflammation nasaly,---> suspicious of a viral keratoconjunctivitis/scleritis    Plan:  - Decrease antibiotics to Q3H left eye   - start erythromycin ointment QID left eye   - Continue artificial tears  -start valacyclovir 1g TID  -new blood workup for viral/inflammatory causes of sclertitis    #history of recurrent chalazia each eye  - Still with lower>upper lid erythema/edema left eye  - Continue levofloxacin 750mg PO every day for 10 day course  - Monitor for evidence of cellulitis    #history of retinal detachment right eye with buckle    Follow up:  1 week w/ V,T,  at next visit, call if problems sooner to clinic.    Dwain Yañez MD  Fellow, Cornea, External Disease and Refractive Surgery  Department of Ophthalmology  [facility]    Attending Physician Attestation:  Complete documentation of historical and exam elements from today's encounter can be found in the full encounter summary report (not reduplicated in this progress note).  I personally obtained the chief complaint(s) and history of present illness.  I confirmed and edited as necessary the review of systems, past medical/surgical history, family history, social history, and examination findings as documented by others; and I examined the patient myself.  I personally reviewed the relevant tests, images, and reports as documented above.  I formulated and edited as necessary the assessment and plan and discussed the findings and management plan with the patient and family. - Vikki Chopra MD

## 2023-01-28 LAB — ACE SERPL-CCNC: 42 U/L

## 2023-01-29 LAB
GAMMA INTERFERON BACKGROUND BLD IA-ACNC: 0 IU/ML
LYSOZYME SERPL-MCNC: 1.39 UG/ML
M TB IFN-G BLD-IMP: NEGATIVE
M TB IFN-G CD4+ BCKGRND COR BLD-ACNC: 10 IU/ML
MITOGEN IGNF BCKGRD COR BLD-ACNC: 0 IU/ML
MITOGEN IGNF BCKGRD COR BLD-ACNC: 0.01 IU/ML
QUANTIFERON MITOGEN: 10 IU/ML
QUANTIFERON NIL TUBE: 0 IU/ML
QUANTIFERON TB1 TUBE: 0 IU/ML
QUANTIFERON TB2 TUBE: 0.01
VZV IGM SER IA-ACNC: 0.1 ISR

## 2023-01-30 LAB
HSV1 IGG SERPL QL IA: 24.8 INDEX
HSV1 IGG SERPL QL IA: ABNORMAL
HSV2 IGG SERPL QL IA: 0.12 INDEX
HSV2 IGG SERPL QL IA: ABNORMAL
RHEUMATOID FACT SER NEPH-ACNC: <7 IU/ML
T PALLIDUM AB SER QL: NONREACTIVE
VZV IGG SER QL IA: 2085 INDEX
VZV IGG SER QL IA: POSITIVE

## 2023-01-31 LAB — ANA SER QL IF: NEGATIVE

## 2023-02-01 ENCOUNTER — OFFICE VISIT (OUTPATIENT)
Dept: OPHTHALMOLOGY | Facility: CLINIC | Age: 63
End: 2023-02-01
Attending: OPHTHALMOLOGY
Payer: COMMERCIAL

## 2023-02-01 DIAGNOSIS — H15.042 SCLERITIS OF LEFT EYE WITH CORNEAL INVOLVEMENT: Primary | ICD-10-CM

## 2023-02-01 PROCEDURE — 99214 OFFICE O/P EST MOD 30 MIN: CPT | Performed by: OPHTHALMOLOGY

## 2023-02-01 PROCEDURE — G0463 HOSPITAL OUTPT CLINIC VISIT: HCPCS

## 2023-02-01 RX ORDER — PREDNISOLONE ACETATE 10 MG/ML
1-2 SUSPENSION/ DROPS OPHTHALMIC 4 TIMES DAILY
Qty: 10 ML | Refills: 0 | Status: SHIPPED | OUTPATIENT
Start: 2023-02-01 | End: 2023-05-30

## 2023-02-01 ASSESSMENT — TONOMETRY
IOP_METHOD: ICARE
OS_IOP_MMHG: 17
OD_IOP_MMHG: 21

## 2023-02-01 ASSESSMENT — CONF VISUAL FIELD
METHOD: COUNTING FINGERS
OS_INFERIOR_TEMPORAL_RESTRICTION: 0
OS_SUPERIOR_TEMPORAL_RESTRICTION: 0
OD_INFERIOR_TEMPORAL_RESTRICTION: 0
OS_NORMAL: 1
OD_NORMAL: 1
OD_INFERIOR_NASAL_RESTRICTION: 0
OS_INFERIOR_NASAL_RESTRICTION: 0
OD_SUPERIOR_TEMPORAL_RESTRICTION: 0
OS_SUPERIOR_NASAL_RESTRICTION: 0
OD_SUPERIOR_NASAL_RESTRICTION: 0

## 2023-02-01 ASSESSMENT — REFRACTION_WEARINGRX
OD_AXIS: 080
OS_CYLINDER: SPHERE
OD_SPHERE: -10.00
OD_CYLINDER: +1.00
OS_SPHERE: -12.00

## 2023-02-01 ASSESSMENT — VISUAL ACUITY
OS_CC: 20/70
OD_PH_CC+: +1
OS_CC+: -2
OD_CC+: -1
OS_PH_CC+: -1
OD_PH_CC: 20/60
CORRECTION_TYPE: GLASSES
METHOD: SNELLEN - LINEAR
OD_CC: 20/80
OS_PH_CC: 20/60

## 2023-02-01 ASSESSMENT — EXTERNAL EXAM - LEFT EYE: OS_EXAM: NORMAL

## 2023-02-01 ASSESSMENT — EXTERNAL EXAM - RIGHT EYE: OD_EXAM: NORMAL

## 2023-02-01 ASSESSMENT — SLIT LAMP EXAM - LIDS: COMMENTS: NORMAL

## 2023-02-01 NOTE — NURSING NOTE
Chief Complaints and History of Present Illnesses   Patient presents with     Follow Up     Central corneal ulcer of left eye     Chief Complaint(s) and History of Present Illness(es)     Follow Up            Laterality: left eye    Course: gradually improving    Associated symptoms: eye pain, redness, tearing (minimal) and discharge    Treatments tried: eye drops    Pain scale: 2/10    Comments: Central corneal ulcer of left eye          Comments    She states that she is having less pain and less redness in her left eye.  Her vision remains blurred.    She is using:  - Fortified vancomycin drops Q3H left eye while awake  - Fortified tobramycin drops Q3H left eye while awake  - Levofloxacin 750 MG tablet daily  - Valacyclovir 1g TID    EDDA Calvo 7:54 AM  February 1, 2023

## 2023-02-01 NOTE — PROGRESS NOTES
Chief complaint   Corneal ulcer follow-up     Referring Provider: Dr. Delta CHUNG    Paulette Abbott 62 year old female with a history of left retinal detachment, hard contact lens use and recent corneal ulceration of the left eye presents for follow-up. Patient was seen on 1/20/23 in acute clinic and noted to have 2 days of left eye pain/eyelid swelling. She was noted to have left corneal ulcer in the left eye with particular concern for HSV. Cultures for bacteria, fungus, acathamoeba and HSV PCR were collected at that time. She was also started on fortified antibiotic drops.     Interval: Follow Up  In left eye. Since onset it is gradually improving. Associated symptoms include eye pain, redness, tearing (minimal), and discharge. Treatments tried include eye drops. Pain was noted as 2/10. Central corneal ulcer of left eye    Past ocular history   Prior eye surgery/laser/Trauma: retinal detachment repair >20 years ago  CTL wearer: hard contact lens  Glasses : -  Family Hx of eye disease:-    PMH     Past Medical History:   Diagnosis Date     Hypertension      Retinal detachment        PSH     Past Surgical History:   Procedure Laterality Date     EYE SURGERY       OPEN REDUCTION INTERNAL FIXATION WRIST Right 4/21/2021    Procedure: OPEN REDUCTION INTERNAL FIXATION, FRACTURE, WRIST;  Surgeon: Edu Estrella MD;  Location: [facility] OR     RETINAL REATTACHMENT         Meds     Current Outpatient Medications   Medication     levofloxacin (LEVAQUIN) 750 MG tablet     tobramycin (TOBREX) 15 mg/mL compounded ophthalmic solution     valACYclovir (VALTREX) 1000 mg tablet     vancomycin (VANCOCIN) 25 mg/mL in hypromellose 0.3% cmpd ophthalmic solution     latanoprost (XALATAN) 0.005 % ophthalmic solution     No current facility-administered medications for this visit.       Labs   KOH/Gram stain: negative to date  Anaerobic culture: NGTD  Aerobic Culture: NGTD  Fungal Culture: NGTD  HSV PCR: Negative    Imaging    -    Drops Currently Taking   Fortified vancomycin drops Q2H left eye  Fortified tobramycin drops Q2H left eye   PFATs PRN each eye    Levofloxacin 750mg PO QD    Assessment/Plan   # scleritis + Corneal ulcer, left eye  - History of long-term rigid CTL wear bilaterally. Uses CTL solution to clean. Usually takes them out nightly but prior to developing symptoms she left them in.  - stable VA today, resolved corneal epithelial defect, improvement of the density of the corneal haze next to the infected area  - new conjunctival/scleral inflammation nasaly,---> suspicious of a viral keratoconjunctivitis/scleritis  -area of scleromalacia noted infero-nasally.  -negative inflammatory and infectious workup except for positive IGG for HSV and VZV    Plan:  - stop vancomycin and tobramycin drops  - continue erythromycin ointment QID left eye   - Continue artificial tears  -continue valacyclovir 1g TID  -start prednisolone eye drop QID  -refer to Dr Valderrama for scleritis management and possibility of oral steroids    #history of recurrent chalazia each eye  - Still with lower>upper lid erythema/edema left eye  - Continue levofloxacin 750mg PO every day for 10 day course  - Monitor for evidence of cellulitis    #history of retinal detachment right eye with buckle    Follow up:  1 week w/ V,T,  at next visit, call if problems sooner to clinic.    Attending Physician Attestation:  Complete documentation of historical and exam elements from today's encounter can be found in the full encounter summary report (not reduplicated in this progress note).  I personally obtained the chief complaint(s) and history of present illness.  I confirmed and edited as necessary the review of systems, past medical/surgical history, family history, social history, and examination findings as documented by others; and I examined the patient myself.  I personally reviewed the relevant tests, images, and reports as documented above.  I formulated  and edited as necessary the assessment and plan and discussed the findings and management plan with the patient and family. - Vikki Chopra MD

## 2023-02-17 LAB — BACTERIA TISS BX CULT: NO GROWTH

## 2023-02-21 ENCOUNTER — OFFICE VISIT (OUTPATIENT)
Dept: OPTOMETRY | Facility: CLINIC | Age: 63
End: 2023-02-21
Payer: COMMERCIAL

## 2023-02-21 DIAGNOSIS — H52.13 MYOPIA WITH PRESBYOPIA OF BOTH EYES: Primary | ICD-10-CM

## 2023-02-21 DIAGNOSIS — H52.4 MYOPIA WITH PRESBYOPIA OF BOTH EYES: Primary | ICD-10-CM

## 2023-02-21 ASSESSMENT — REFRACTION_CURRENTRX
OS_BRAND: RECLAIM HD
OD_DIAMETER: 9.5
OD_BRAND: RECLAIM HD
OD_SPHERE: -9.37
OS_BASECURVE: 7.95
OS_DIAMETER: 9.5
OS_SPHERE: -10.50
OD_ADD: +3.00
OS_ADD: +3.00
OD_BASECURVE: 7.95

## 2023-02-21 NOTE — PROGRESS NOTES
No office visit. CL order only. Pt would like new pair of duplicate GP's. Ordered and mailed direct to below address:      [address] [place] [address].     Contact Lens Billing  V-Code:  GP bifocal  Final Contact Lens Rx       Brand Base Curve Diameter Sphere Add Lens    Right Reclaim HD 7.95 9.5 -9.37 +3.00 [place] ES Blue, dot     Left Reclaim HD 7.95 9.5 -10.50 +3.00 [place] Blue         # of units: 2  Price per Unit: $150    These are for cosmetic contact lenses.    Encounter Diagnosis   Name Primary?     Myopia with presbyopia of both eyes Yes       Date of last eye exam: 10/25/21

## 2023-03-20 ENCOUNTER — OFFICE VISIT (OUTPATIENT)
Dept: OPHTHALMOLOGY | Facility: CLINIC | Age: 63
End: 2023-03-20
Attending: OPHTHALMOLOGY
Payer: COMMERCIAL

## 2023-03-20 DIAGNOSIS — Z86.69 HISTORY OF RETINAL DETACHMENT: Primary | ICD-10-CM

## 2023-03-20 DIAGNOSIS — H16.002 CORNEAL ULCER, LEFT: ICD-10-CM

## 2023-03-20 DIAGNOSIS — Z80.0 FAMILY HISTORY OF COLON CANCER IN FATHER: ICD-10-CM

## 2023-03-20 DIAGNOSIS — H40.051 OCULAR HYPERTENSION, RIGHT EYE: ICD-10-CM

## 2023-03-20 PROCEDURE — G0463 HOSPITAL OUTPT CLINIC VISIT: HCPCS | Performed by: OPHTHALMOLOGY

## 2023-03-20 PROCEDURE — 99214 OFFICE O/P EST MOD 30 MIN: CPT | Performed by: OPHTHALMOLOGY

## 2023-03-20 PROCEDURE — 92133 CPTRZD OPH DX IMG PST SGM ON: CPT | Performed by: OPHTHALMOLOGY

## 2023-03-20 ASSESSMENT — REFRACTION_WEARINGRX
OS_CYLINDER: +1.00
OS_SPHERE: -13.00
OS_AXIS: 090
OD_AXIS: 090
OD_SPHERE: -11.50
OS_ADD: +2.50
OD_CYLINDER: +1.50
OD_ADD: +2.50

## 2023-03-20 ASSESSMENT — CUP TO DISC RATIO
OS_RATIO: 0.5
OD_RATIO: 0.4

## 2023-03-20 ASSESSMENT — CONF VISUAL FIELD
OD_SUPERIOR_NASAL_RESTRICTION: 0
OD_SUPERIOR_TEMPORAL_RESTRICTION: 0
OS_SUPERIOR_NASAL_RESTRICTION: 0
OD_INFERIOR_TEMPORAL_RESTRICTION: 0
OS_INFERIOR_TEMPORAL_RESTRICTION: 0
OS_SUPERIOR_TEMPORAL_RESTRICTION: 0
OS_INFERIOR_NASAL_RESTRICTION: 0
OD_INFERIOR_NASAL_RESTRICTION: 0
METHOD: COUNTING FINGERS
OS_NORMAL: 1
OD_NORMAL: 1

## 2023-03-20 ASSESSMENT — VISUAL ACUITY
OS_CC: 20/50
OD_CC: 20/60
OD_CC+: -1
METHOD: SNELLEN - LINEAR
OS_CC+: -1
CORRECTION_TYPE: GLASSES

## 2023-03-20 ASSESSMENT — SLIT LAMP EXAM - LIDS
COMMENTS: NORMAL
COMMENTS: NORMAL

## 2023-03-20 ASSESSMENT — EXTERNAL EXAM - RIGHT EYE: OD_EXAM: NORMAL

## 2023-03-20 ASSESSMENT — TONOMETRY
IOP_METHOD: TONOPEN
OS_IOP_MMHG: 19
OD_IOP_MMHG: 21

## 2023-03-20 ASSESSMENT — EXTERNAL EXAM - LEFT EYE: OS_EXAM: NORMAL

## 2023-03-20 NOTE — PATIENT INSTRUCTIONS
Okay to resume contact lenses  Continue the steroid drop with tapering instructions in the left eye: Reduce 3x/day for two weeks (until 4/3/23), then 2x/day for two weeks (until 4/17/23), then 1x/day for two weeks (until 5/1/23), then stop. Let us know if anything changes  No need for Latanoprost, antibiotic drops left eye, nor oral Valacyclovir  We placed a consult to Primary Care given history of colon cancer in Ms. Abbott's Father and subtle pigmentary changes left eye. This is VERY UNLIKELY to be related to a family history of colon cancer syndrome, but reasonable to get age appropriate colonoscopy

## 2023-03-20 NOTE — PROGRESS NOTES
Chief Complaint/Presenting Concern: Uveitis evaluation    History of Present Illness:   Paulette Abbott is a 62 year old patient who presents for evaluation of possible scleritis left eye. She was treated for a corneal ulcer on the left eye which resolved on antibiotic drops but  asked for this evaluation given history of possible scleritis.    Ms. Abbott reports the eye feels fine. She is only on steroid drop 4x/day, no Valtrex. She thinks the gray spot on the left eye has been present for some time.    Additional Ocular History:   1. History retinal detachment repaired left eye  and then buckle removed at least 10 years ago on left eye possibly from iritis. No history of infections on left eye prior to recent issue as above  2. Prior use of Latanoprost for elevated eye pressure, not used in months.   3. Prior chicken pox    Relevant Past Medical/Family/Social History:  No Rheumatologic disease history    Father with Glaucoma. History of Colon Cancer in his 70s.    Works as CPA    Relevant Review of Systems: No joint pains, no rashes, no trouble breating    Labs: 1/2023:  Positive Zoster IgG and HSV-1 IgG  Normal/negative; Treponema, Quantiferon,CBC, CRP, RF, LARS, ACE     Current eye related medications: Prednisolone 4x/day left eye, No Latanoprost in months, No Valtrex    Retina/Uveitis Imaging:  OCT Spectralis Macula March 20, 2023  right eye: Myopic contour, no fluid. Stable   left eye: Stable inner retinal irregularity superior and inferior, ERM without fluid or thickening. Stable    OCT Optic Nerve RNFL Spectralis March 20, 2023  right eye: Avg thickness 81 microns, stable borderline superior and inf temp  left eye: Avg thickness 63 microns, stable thin superior and nasal    Assessment:    1. History of retinal detachment - Left Eye  Remains attached.  There is an area of scleral thinning inferonasally which may have been where the buckle was placed and then removed    2. Corneal ulcer,  left  Resolved without recurrence    3. Ocular hypertension, right eye  Normal today in both eyes    4. Family history of colon cancer in father  Addressed as below given subtle pigmentary changes in the left eye    Plan/Recommendations:    Discussed findings with patient. The corneal ulcer has not recurred and there is no intraocular inflammation.  The area of thinning of the sclera is likely related to the area where the scleral buckle was previously placed and then removed.  This likely does not represent active scleritis and there is no uveal exposure.  No other treatment is recommended and we can continue to taper the steroid eyedrops    The retina remains attached in the left eye and there are peripheral areas of scarring noted.  There are some subtle pigmentary changes more posteriorly which are most likely related to prior retinal detachment repair and likely do not represent any evidence of cancer systemically nor in the eye.  However given these changes and the family history of colon cancer the patient's father we recommend primary care consultation and colonoscopy to rule out inherited risk factors for colon cancer    Eye pressure is normal in both eyes.  There are stable areas of thinning on the optic nerve scans which are likely related to myopia and likely not glaucoma.  We can continue to monitor latanoprost but plan to repeat optic nerve scan again soon    Recommend additional diagnostic testing: None needed    Okay to resume contact lenses    Continue the steroid drop with tapering instructions in the left eye: Reduce 3x/day for two weeks (until 4/3/23), then 2x/day for two weeks (until 4/17/23), then 1x/day for two weeks (until 5/1/23), then stop. Let us know if anything changes    No need for Latanoprost, antibiotic drops left eye, nor oral Valacyclovir    We placed a consult to Primary Care given history of colon cancer in Ms. Abbott's Father and subtle pigmentary changes left eye. This is  VERY UNLIKELY to be related to a family history of colon cancer syndrome, but reasonable to get age appropriate colonoscopy    RTC Late May tonopen, no dilation, no testing. Plan to repeat RNFL before end of 2023    Physician Attestation     Attending Physician Attestation:  Complete documentation of historical and exam elements from today's encounter can be found in the full encounter summary report (not reduplicated in this progress note). I personally obtained the chief complaint(s) and history of present illness. I confirmed and edited as necessary the review of systems, past medical/surgical history, family history, social history, and examination findings as documented by others; and I examined the patient myself. I personally reviewed the relevant tests, images, and reports as documented above. I formulated and edited as necessary the assessment and plan and discussed the findings and management plan with the patient and any family members present at the time of the visit.  Ghulam Valderrama M.D., Uveitis and Medical Retina, March 20, 2023

## 2023-03-20 NOTE — NURSING NOTE
Chief Complaints and History of Present Illnesses   Patient presents with     Scleritis Evaluation     Chief Complaint(s) and History of Present Illness(es)     Scleritis Evaluation            Laterality: left eye    Onset: gradual    Onset: months ago    Quality: Feels the va is better    Severity: moderate    Frequency: intermittently    Timing: in the evening    Associated symptoms: flashes and floaters.  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

## 2023-05-30 ENCOUNTER — OFFICE VISIT (OUTPATIENT)
Dept: OPHTHALMOLOGY | Facility: CLINIC | Age: 63
End: 2023-05-30
Attending: OPHTHALMOLOGY
Payer: COMMERCIAL

## 2023-05-30 DIAGNOSIS — Z86.69 HISTORY OF RETINAL DETACHMENT: Primary | ICD-10-CM

## 2023-05-30 DIAGNOSIS — H16.002 CORNEAL ULCER, LEFT: ICD-10-CM

## 2023-05-30 DIAGNOSIS — H40.051 OCULAR HYPERTENSION, RIGHT EYE: ICD-10-CM

## 2023-05-30 PROCEDURE — 99213 OFFICE O/P EST LOW 20 MIN: CPT | Performed by: OPHTHALMOLOGY

## 2023-05-30 PROCEDURE — G0463 HOSPITAL OUTPT CLINIC VISIT: HCPCS | Performed by: OPHTHALMOLOGY

## 2023-05-30 ASSESSMENT — VISUAL ACUITY
OD_CC: 20/70
OS_CC: 20/50
CORRECTION_TYPE: GLASSES
METHOD: SNELLEN - LINEAR
OD_PH_CC: 20/50
OD_CC+: +1

## 2023-05-30 ASSESSMENT — EXTERNAL EXAM - RIGHT EYE: OD_EXAM: NORMAL

## 2023-05-30 ASSESSMENT — REFRACTION_WEARINGRX
OD_AXIS: 090
OS_CYLINDER: +1.00
OS_SPHERE: -13.00
OD_ADD: +2.50
OD_SPHERE: -11.50
OS_AXIS: 090
OS_ADD: +2.50
OD_CYLINDER: +1.50

## 2023-05-30 ASSESSMENT — CONF VISUAL FIELD
OS_NORMAL: 1
OD_SUPERIOR_TEMPORAL_RESTRICTION: 0
OS_INFERIOR_NASAL_RESTRICTION: 0
OD_SUPERIOR_NASAL_RESTRICTION: 0
OS_SUPERIOR_NASAL_RESTRICTION: 0
METHOD: COUNTING FINGERS
OS_INFERIOR_TEMPORAL_RESTRICTION: 0
OS_SUPERIOR_TEMPORAL_RESTRICTION: 0
OD_NORMAL: 1
OD_INFERIOR_TEMPORAL_RESTRICTION: 0
OD_INFERIOR_NASAL_RESTRICTION: 0

## 2023-05-30 ASSESSMENT — SLIT LAMP EXAM - LIDS
COMMENTS: NORMAL
COMMENTS: NORMAL

## 2023-05-30 ASSESSMENT — CUP TO DISC RATIO
OS_RATIO: 0.5
OD_RATIO: 0.4

## 2023-05-30 ASSESSMENT — EXTERNAL EXAM - LEFT EYE: OS_EXAM: NORMAL

## 2023-05-30 ASSESSMENT — TONOMETRY
IOP_METHOD: TONOPEN
OS_IOP_MMHG: 18
OD_IOP_MMHG: 22

## 2023-05-30 NOTE — NURSING NOTE
Chief Complaints and History of Present Illnesses   Patient presents with     Retinal Evaluation     Chief Complaint(s) and History of Present Illness(es)     Retinal Evaluation            Laterality: both eyes    Onset: months ago    Quality: States va is the same since last visit      Severity: moderate    Frequency: intermittently    Associated symptoms: flashes and floaters.  Negative for photophobia    Treatments tried: no treatments and eye drops    Pain scale: 0/10          Comments    Here for history of retinal detachment - Left Eye  Arielle Annalee COT 9:14 AM May 30, 2023

## 2023-05-30 NOTE — LETTER
5/30/2023       RE: Paulette Abbott  [id number]  [facility] [address]     Dear Colleague,    Thank you for referring your patient, Paulette Abbott, to the [facility] [place] at [facility]. Please see a copy of my visit note below.    Chief Complaint/Presenting Concern:  Uveitis follow up.    Interval History of Present Ocular Illness:  Paulette Abbott is a 62 year old patient who returns for follow up of history of retinal detachment left eye. Last visit, we discussed tapering steroid drops in the left eye given no active inflammation. Ms. Abbott reports things are doing well off steroid drops.     Interval Updates to Medical/Family/Social History:  Not yet scheduled for Colonoscopy.    Relevant Review of Systems Updates:  No changes.    Current eye related medications: Off steroid drop left eye for few weeks, no antibiotic drops, no Valtrex.    Retina/Uveitis Imaging: None today.    Assessment:     1. History of retinal detachment - Left Eye  The area of scleral thinning is stable.    2. Corneal ulcer, left  No recurrence.    3. Ocular hypertension, right eye  Again just slightly elevated.     Plan/Recommendations:    Discussed findings with patient. The left eye is doing well without recurrence of redness, corneal ulcer off steroid drops. The area of thinning inferonasally on the sclera persists but is unchanged.  Eye pressure is 22,18. We can monitor without Latanoprost and repeat RNFL scan next time.  No steroid drops, no antibiotic drops, no oral antiviral pills.  Call us/message if anything changes.     RTC 3 months tonopen, dilate left eye only, RNFL and ask about colonoscopy    Physician Attestation     Attending Physician Attestation:  Complete documentation of historical and exam elements from today's encounter can be found in the full encounter summary report (not reduplicated in this progress note). I personally obtained  the chief complaint(s) and history of present illness. I confirmed and edited as necessary the review of systems, past medical/surgical history, family history, social history, and examination findings as documented by others; and I examined the patient myself. I personally reviewed the relevant tests, images, and reports as documented above. I formulated and edited as necessary the assessment and plan and discussed the findings and management plan with the patient and family members present at the time of this visit.  Ghulam Valderrama M.D., Uveitis and Medical Retina, May 30, 2023       Again, thank you for allowing me to participate in the care of your patient.      Sincerely,    Ghulam Valderrama MD  [facility] Dept of Ophthalmology  Uveitis and Medical Retina

## 2023-05-30 NOTE — PROGRESS NOTES
Chief Complaint/Presenting Concern:  Uveitis follow up    Interval History of Present Ocular Illness:  Paulette Abbott is a 62 year old patient who returns for follow up of history of retinal detachment left eye. Last visit, we discussed tapering steroid drops in the left eye given no active inflammation. Ms. Abbott reports things are doing well off steroid drops.     Interval Updates to Medical/Family/Social History:  Not yet scheduled for Colonosocpy    Relevant Review of Systems Updates:  No changes    Current eye related medications: Off steroid drop left eye for few weeks, no antibiotic drops, no Valtrex    Retina/Uveitis Imaging: None today    Assessment:     1. History of retinal detachment - Left Eye  The area of scleral thinning is stable    2. Corneal ulcer, left  No recurrence    3. Ocular hypertension, right eye  Again just slightly elevated     Plan/Recommendations:      Discussed findings with patient. The left eye is doing well without recurrence of redness, corneal ulcer off steroid drops. The area of thinning inferonasally on the sclera persists but is unchanged.    Eye pressure is 22,18. We can monitor without Latanoprost and repeat RNFL scan next time    No steroid drops, no antibiotic drops, no oral antiviral pills    Call us/message if anything changes.     RTC 3 months tonopen, dilate left eye only, RNFL and ask about colonoscopy    Physician Attestation     Attending Physician Attestation:  Complete documentation of historical and exam elements from today's encounter can be found in the full encounter summary report (not reduplicated in this progress note). I personally obtained the chief complaint(s) and history of present illness. I confirmed and edited as necessary the review of systems, past medical/surgical history, family history, social history, and examination findings as documented by others; and I examined the patient myself. I personally reviewed the relevant tests, images, and  reports as documented above. I formulated and edited as necessary the assessment and plan and discussed the findings and management plan with the patient and family members present at the time of this visit.  Ghulam Valderrama M.D., Uveitis and Medical Retina, May 30, 2023

## 2023-05-30 NOTE — PATIENT INSTRUCTIONS
No steroid drops, no antibiotic drops, no oral antiviral pills  Call us/message if anything changes.

## 2023-06-01 ENCOUNTER — HEALTH MAINTENANCE LETTER (OUTPATIENT)
Age: 63
End: 2023-06-01

## 2023-06-11 ENCOUNTER — HEALTH MAINTENANCE LETTER (OUTPATIENT)
Age: 63
End: 2023-06-11

## 2023-06-12 ENCOUNTER — ANCILLARY PROCEDURE (OUTPATIENT)
Dept: GENERAL RADIOLOGY | Facility: CLINIC | Age: 63
End: 2023-06-12
Attending: FAMILY MEDICINE
Payer: COMMERCIAL

## 2023-06-12 ENCOUNTER — OFFICE VISIT (OUTPATIENT)
Dept: ORTHOPEDICS | Facility: CLINIC | Age: 63
End: 2023-06-12
Payer: COMMERCIAL

## 2023-06-12 DIAGNOSIS — S96.811A RUPTURE OF PLANTARIS TENDON, RIGHT, INITIAL ENCOUNTER: Primary | ICD-10-CM

## 2023-06-12 DIAGNOSIS — M25.561 RIGHT KNEE PAIN, UNSPECIFIED CHRONICITY: ICD-10-CM

## 2023-06-12 PROCEDURE — 99213 OFFICE O/P EST LOW 20 MIN: CPT | Performed by: FAMILY MEDICINE

## 2023-06-12 PROCEDURE — 73562 X-RAY EXAM OF KNEE 3: CPT | Mod: RT | Performed by: STUDENT IN AN ORGANIZED HEALTH CARE EDUCATION/TRAINING PROGRAM

## 2023-06-12 NOTE — PROGRESS NOTES
Sports Medicine Clinic Visit    PCP: No Ref-Primary, Physician    Paulette Abbott is a 62 year old female who is seen  as self referral presenting with right lower leg pain.     Injury: Patient reports that she ran a 5k ten days ago and noticed some mild posterior right lower leg pain.  Then patient was walking this morning and felt/heard a sudden pop posterior to the right knee and subsequently noted tenderness and bruising.  Now she has discomfort with weightbearing involving the area of the right posterior upper calf.    Location of Pain: Right posterior knee  Duration of Pain: 10 days   Rating of Pain: 9/10  Pain is better with: Knee    Pain is worse with: Weightbearing   Additional Features: Patient was walking this morning and felt/heard a pop   Treatment so far consists of: Knee  and bracing   Prior History of related problems: None     There were no vitals taken for this visit.        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        Active problem list:  Patient Active Problem List   Diagnosis     Closed fracture of right distal radius     Aftercare following surgery for injury and trauma       FH:  Family History   Problem Relation Age of Onset     Hypertension Mother      Colon Cancer Father      Hypertension Father      Glaucoma Father      Diabetes Maternal Grandmother      Rectal Cancer Brother      Hypertension Brother      Macular Degeneration No family hx of        SH:  Social History     Socioeconomic History     Marital status: Single     Spouse name: Not on file     Number of children: Not on file     Years of education: Not on file     Highest education level: Not on file   Occupational History     Not on file   Tobacco Use     Smoking status: Former     Smokeless tobacco: Never   Vaping Use     Vaping status: Not on file   Substance and Sexual Activity     Alcohol use: Yes     Comment:  0-1/week     Drug use: Never     Sexual activity: Not Currently     Partners: Male   Other Topics Concern     Not on file   Social History Narrative     Not on file     Social Determinants of Health     Financial Resource Strain: Not on file   Food Insecurity: Not on file   Transportation Needs: Not on file   Physical Activity: Not on file   Stress: Not on file   Social Connections: Not on file   Intimate Partner Violence: Not on file   Housing Stability: Not on file       MEDS:  See EMR, reviewed  ALL:  See EMR, reviewed    REVIEW OF SYSTEMS:  CONSTITUTIONAL:NEGATIVE for fever, chills, change in weight  INTEGUMENTARY/SKIN: NEGATIVE for worrisome rashes, moles or lesions  EYES: NEGATIVE for vision changes or irritation  ENT/MOUTH: NEGATIVE for ear, mouth and throat problems  RESP:NEGATIVE for significant cough or SOB  BREAST: NEGATIVE for masses, tenderness or discharge  CV: NEGATIVE for chest pain, palpitations or peripheral edema  GI: NEGATIVE for nausea, abdominal pain, heartburn, or change in bowel habits  :NEGATIVE for frequency, dysuria, or hematuria  :NEGATIVE for frequency, dysuria, or hematuria  NEURO: NEGATIVE for weakness, dizziness or paresthesias  ENDOCRINE: NEGATIVE for temperature intolerance, skin/hair changes  HEME/ALLERGY/IMMUNE: NEGATIVE for bleeding problems  PSYCHIATRIC: NEGATIVE for changes in mood or affect      Objective: She can flex and extend the right knee fully.  There is no effusion.  Standing next to a table she can flex her knee against resistance with excellent strength and extend it as well.  She will plantarflex and dorsiflex her right foot with good strength.  Sensation is normal.  There is subtle bruising in the upper part of the calf on the right and she is tender along the mid substance of the upper calf.  There is no swelling in the popliteal space.  Subtle swelling in the calf.  No palpable cords.  Homans' sign is negative.    I Personally viewed with the patient x-rays  of the right knee that show moderate patellofemoral DJD.  Mild tibiofemoral DJD      Assessment: Acute right-sided plantaris rupture.  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    Plan: We discussed the natural history of plantaris muscle ruptures.  She would like crutches to assist with weightbearing.  She does have a scooter available.  She will look for improvements in weightbearing progressively over the next 2 to 4 weeks.  She will advance to some gentle hamstring and calf stretches which were provided.  Ice pack and Tylenol as needed for pain.  If she does not see a progressive improvement in her ambulatory status over the next month she will return for follow-up.

## 2023-06-12 NOTE — LETTER
6/12/2023      RE: Paulette Abbott  [id number]  [facility] [address]     Dear Colleague,    Thank you for referring your patient, Paulette Abbott, to the [facility] [place]. Please see a copy of my visit note below.    Sports Medicine Clinic Visit    PCP: No Ref-Primary, Physician    Paulette Abbott is a 62 year old female who is seen  as self referral presenting with right lower leg pain.     Injury: Patient reports that she ran a 5k ten days ago and noticed some mild posterior right lower leg pain.  Then patient was walking this morning and felt/heard a sudden pop posterior to the right knee and subsequently noted tenderness and bruising.  Now she has discomfort with weightbearing involving the area of the right posterior upper calf.    Location of Pain: Right posterior knee  Duration of Pain: 10 days   Rating of Pain: 9/10  Pain is better with: Knee    Pain is worse with: Weightbearing   Additional Features: Patient was walking this morning and felt/heard a pop   Treatment so far consists of: Knee  and bracing   Prior History of related problems: None     There were no vitals taken for this visit.        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        Active problem list:  Patient Active Problem List   Diagnosis     Closed fracture of right distal radius     Aftercare following surgery for injury and trauma       FH:  Family History   Problem Relation Age of Onset     Hypertension Mother      Colon Cancer Father      Hypertension Father      Glaucoma Father      Diabetes Maternal Grandmother      Rectal Cancer Brother      Hypertension Brother      Macular Degeneration No family hx of        SH:  Social History     Socioeconomic History     Marital status: Single     Spouse name: Not on file     Number of children: Not on file     Years of education: Not on  file     Highest education level: Not on file   Occupational History     Not on file   Tobacco Use     Smoking status: Former     Smokeless tobacco: Never   Vaping Use     Vaping status: Not on file   Substance and Sexual Activity     Alcohol use: Yes     Comment: 0-1/week     Drug use: Never     Sexual activity: Not Currently     Partners: Male   Other Topics Concern     Not on file   Social History Narrative     Not on file     Social Determinants of Health     Financial Resource Strain: Not on file   Food Insecurity: Not on file   Transportation Needs: Not on file   Physical Activity: Not on file   Stress: Not on file   Social Connections: Not on file   Intimate Partner Violence: Not on file   Housing Stability: Not on file       MEDS:  See EMR, reviewed  ALL:  See EMR, reviewed    REVIEW OF SYSTEMS:  CONSTITUTIONAL:NEGATIVE for fever, chills, change in weight  INTEGUMENTARY/SKIN: NEGATIVE for worrisome rashes, moles or lesions  EYES: NEGATIVE for vision changes or irritation  ENT/MOUTH: NEGATIVE for ear, mouth and throat problems  RESP:NEGATIVE for significant cough or SOB  BREAST: NEGATIVE for masses, tenderness or discharge  CV: NEGATIVE for chest pain, palpitations or peripheral edema  GI: NEGATIVE for nausea, abdominal pain, heartburn, or change in bowel habits  :NEGATIVE for frequency, dysuria, or hematuria  :NEGATIVE for frequency, dysuria, or hematuria  NEURO: NEGATIVE for weakness, dizziness or paresthesias  ENDOCRINE: NEGATIVE for temperature intolerance, skin/hair changes  HEME/ALLERGY/IMMUNE: NEGATIVE for bleeding problems  PSYCHIATRIC: NEGATIVE for changes in mood or affect      Objective: She can flex and extend the right knee fully.  There is no effusion.  Standing next to a table she can flex her knee against resistance with excellent strength and extend it as well.  She will plantarflex and dorsiflex her right foot with good strength.  Sensation is normal.  There is subtle bruising in the  upper part of the calf on the right and she is tender along the mid substance of the upper calf.  There is no swelling in the popliteal space.  Subtle swelling in the calf.  No palpable cords.  Homans' sign is negative.    I Personally viewed with the patient x-rays of the right knee that show moderate patellofemoral DJD.  Mild tibiofemoral DJD      Assessment: Acute right-sided plantaris rupture.  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    Plan: We discussed the natural history of plantaris muscle ruptures.  She would like crutches to assist with weightbearing.  She does have a scooter available.  She will look for improvements in weightbearing progressively over the next 2 to 4 weeks.  She will advance to some gentle hamstring and calf stretches which were provided.  Ice pack and Tylenol as needed for pain.  If she does not see a progressive improvement in her ambulatory status over the next month she will return for follow-up.                Again, thank you for allowing me to participate in the care of your patient.      Sincerely,    Frank Choi MD

## 2023-07-05 ENCOUNTER — TELEPHONE (OUTPATIENT)
Dept: OPHTHALMOLOGY | Facility: CLINIC | Age: 63
End: 2023-07-05
Payer: COMMERCIAL

## 2023-07-10 ENCOUNTER — TELEPHONE (OUTPATIENT)
Dept: OPHTHALMOLOGY | Facility: CLINIC | Age: 63
End: 2023-07-10
Payer: COMMERCIAL

## 2023-08-07 NOTE — LETTER
3/20/2023       RE: Paulette Abbott  [id number]  [facility] [address]     Dear Colleague,    Thank you for referring your patient, Paulette Abbott, to the [facility] [place] at [facility]. Please see a copy of my visit note below.    Chief Complaint/Presenting Concern: Uveitis evaluation    History of Present Illness:   Paulette Abbott is a 62 year old patient who presents for evaluation of possible scleritis left eye. She was treated for a corneal ulcer on the left eye which resolved on antibiotic drops but  asked for this evaluation given history of possible scleritis.    Ms. Abbott reports the eye feels fine. She is only on steroid drop 4x/day, no Valtrex. She thinks the gray spot on the left eye has been present for some time.    Additional Ocular History:   1. History retinal detachment repaired left eye  and then buckle removed at least 10 years ago on left eye possibly from iritis. No history of infections on left eye prior to recent issue as above  2. Prior use of Latanoprost for elevated eye pressure, not used in months.   3. Prior chicken pox    Relevant Past Medical/Family/Social History:  No Rheumatologic disease history    Father with Glaucoma. History of Colon Cancer in his 70s.    Works as CPA          Relevant Review of Systems: No joint pains, no rashes, no trouble breating    Labs: 1/2023:  Positive Zoster IgG and HSV-1 IgG  Normal/negative; Treponema, Quantiferon,CBC, CRP, RF, LARS, ACE     Current eye related medications: Prednisolone 4x/day left eye, No Latanoprost in months, No Valtrex    Retina/Uveitis Imaging:  OCT Spectralis Macula March 20, 2023  right eye: Myopic contour, no fluid. Stable   left eye: Stable inner retinal irregularity superior and inferior, ERM without fluid or thickening. Stable    OCT Optic Nerve RNFL Spectralis March 20, 2023  right eye: Avg thickness 81 microns, stable borderline  superior and inf temp  left eye: Avg thickness 63 microns, stable thin superior and nasal    Assessment:    1. History of retinal detachment - Left Eye  Remains attached.  There is an area of scleral thinning inferonasally which may have been where the buckle was placed and then removed    2. Corneal ulcer, left  Resolved without recurrence    3. Ocular hypertension, right eye  Normal today in both eyes    4. Family history of colon cancer in father  Addressed as below given subtle pigmentary changes in the left eye    Plan/Recommendations:    Discussed findings with patient. The corneal ulcer has not recurred and there is no intraocular inflammation.  The area of thinning of the sclera is likely related to the area where the scleral buckle was previously placed and then removed.  This likely does not represent active scleritis and there is no uveal exposure.  No other treatment is recommended and we can continue to taper the steroid eyedrops    The retina remains attached in the left eye and there are peripheral areas of scarring noted.  There are some subtle pigmentary changes more posteriorly which are most likely related to prior retinal detachment repair and likely do not represent any evidence of cancer systemically nor in the eye.  However given these changes and the family history of colon cancer the patient's father we recommend primary care consultation and colonoscopy to rule out inherited risk factors for colon cancer    Eye pressure is normal in both eyes.  There are stable areas of thinning on the optic nerve scans which are likely related to myopia and likely not glaucoma.  We can continue to monitor latanoprost but plan to repeat optic nerve scan again soon    Recommend additional diagnostic testing: None needed    Okay to resume contact lenses    Continue the steroid drop with tapering instructions in the left eye: Reduce 3x/day for two weeks (until 4/3/23), then 2x/day for two weeks (until  4/17/23), then 1x/day for two weeks (until 5/1/23), then stop. Let us know if anything changes    No need for Latanoprost, antibiotic drops left eye, nor oral Valacyclovir    We placed a consult to Primary Care given history of colon cancer in Ms. Abbott's Father and subtle pigmentary changes left eye. This is VERY UNLIKELY to be related to a family history of colon cancer syndrome, but reasonable to get age appropriate colonoscopy    RTC Late May tonopen, no dilation, no testing. Plan to repeat RNFL before end of 2023    Physician Attestation     Attending Physician Attestation:  Complete documentation of historical and exam elements from today's encounter can be found in the full encounter summary report (not reduplicated in this progress note). I personally obtained the chief complaint(s) and history of present illness. I confirmed and edited as necessary the review of systems, past medical/surgical history, family history, social history, and examination findings as documented by others; and I examined the patient myself. I personally reviewed the relevant tests, images, and reports as documented above. I formulated and edited as necessary the assessment and plan and discussed the findings and management plan with the patient and any family members present at the time of the visit.  Ghulam Valderrama M.D., Uveitis and Medical Retina, March 20, 2023     Again, thank you for allowing me to participate in the care of your patient.      Sincerely,    Ghulam Valderrama MD  [facility] Dept of Ophthalmology  Uveitis and Medical Retina     [Home] : at home, [unfilled] , at the time of the visit. [Medical Office: ([facility])___] : at the medical office located in  [Self] : self [Follow-Up Evaluation] : a follow-up evaluation for [Seizure] : seizure [Parents] : parents [Mother] : mother [Patient] : patient

## 2023-09-01 ENCOUNTER — OFFICE VISIT (OUTPATIENT)
Dept: OPHTHALMOLOGY | Facility: CLINIC | Age: 63
End: 2023-09-01
Attending: OPHTHALMOLOGY
Payer: COMMERCIAL

## 2023-09-01 DIAGNOSIS — H15.89: ICD-10-CM

## 2023-09-01 DIAGNOSIS — Z86.69 HISTORY OF RETINAL DETACHMENT: Primary | ICD-10-CM

## 2023-09-01 DIAGNOSIS — H16.002 CORNEAL ULCER, LEFT: ICD-10-CM

## 2023-09-01 DIAGNOSIS — H40.051 OCULAR HYPERTENSION, RIGHT EYE: ICD-10-CM

## 2023-09-01 DIAGNOSIS — Z80.0 FAMILY HISTORY OF COLON CANCER IN FATHER: ICD-10-CM

## 2023-09-01 PROCEDURE — G0463 HOSPITAL OUTPT CLINIC VISIT: HCPCS | Performed by: OPHTHALMOLOGY

## 2023-09-01 PROCEDURE — 92133 CPTRZD OPH DX IMG PST SGM ON: CPT | Performed by: OPHTHALMOLOGY

## 2023-09-01 PROCEDURE — 99213 OFFICE O/P EST LOW 20 MIN: CPT | Mod: GC | Performed by: OPHTHALMOLOGY

## 2023-09-01 ASSESSMENT — CONF VISUAL FIELD
OS_SUPERIOR_TEMPORAL_RESTRICTION: 0
OD_NORMAL: 1
OS_INFERIOR_NASAL_RESTRICTION: 0
METHOD: COUNTING FINGERS
OS_SUPERIOR_NASAL_RESTRICTION: 0
OS_NORMAL: 1
OD_SUPERIOR_NASAL_RESTRICTION: 0
OD_INFERIOR_TEMPORAL_RESTRICTION: 0
OS_INFERIOR_TEMPORAL_RESTRICTION: 0
OD_SUPERIOR_TEMPORAL_RESTRICTION: 0
OD_INFERIOR_NASAL_RESTRICTION: 0

## 2023-09-01 ASSESSMENT — TONOMETRY
OS_IOP_MMHG: 20
IOP_METHOD: APPLANATION
OD_IOP_MMHG: 22
OS_IOP_MMHG: 18
OD_IOP_MMHG: 24
IOP_METHOD: TONOPEN

## 2023-09-01 ASSESSMENT — PACHYMETRY
OD_CT(UM): 577
OS_CT(UM): 546

## 2023-09-01 ASSESSMENT — CUP TO DISC RATIO
OD_RATIO: 0.4
OS_RATIO: 0.5

## 2023-09-01 ASSESSMENT — VISUAL ACUITY
CORRECTION_TYPE: GLASSES
METHOD: SNELLEN - LINEAR
OD_CC: 20/60
OD_PH_CC: 20/50
OS_CC: 20/60
OD_CC+: -1

## 2023-09-01 ASSESSMENT — EXTERNAL EXAM - LEFT EYE: OS_EXAM: NORMAL

## 2023-09-01 ASSESSMENT — EXTERNAL EXAM - RIGHT EYE: OD_EXAM: NORMAL

## 2023-09-01 ASSESSMENT — SLIT LAMP EXAM - LIDS
COMMENTS: NORMAL
COMMENTS: NORMAL

## 2023-09-01 NOTE — PROGRESS NOTES
Chief Complaint/Presenting Concern:  Uveitis follow up    Interval History of Present Ocular Illness:  Paulette Abbott is a 62 year old patient who returns for follow up of retinal detachment and prior scleral thinning left eye. Last visit 530/23 we discussed her ocular hypertension which remained slightly elevated but we elected to observe.    Today she is here for pressure check and RNFL and Ms. Abbott reports things are stable. No  new concerns with her eyes and no increasing gray spot left eye.     Interval Updates to Medical/Family/Social History:    Tore a ligament while running in June, healing well. Colonoscopy to be scheduled    Relevant Review of Systems Updates:  No other health issues    Laboratory Testing: None     Current eye related medications: No drops    Retina/Uveitis Imaging:   OCT Spectralis Macula September 1, 2023  right eye: Myopic contour, no fluid, thin choroid. Stable   left eye: Myopic contour, ERM, no fluid, thin choroid. Stable    RNFL September 1, 2023  Right eye: Avg thickness 83 microns, borderline IT and ST (improved ST)  Left eye:Avg thickness 68 microns, stable thin sup and nasal, and borderline inf nasal    Assessment:     1. History of retinal detachment - Left Eye  Remains attached    2. Thin anterior sclera - Left Eye  Stable without extension and likely related to prior areas of surgery from scleral buckle removal    3. Corneal ulcer, left  Resolved without recurrence    4. Ocular hypertension, right eye  IOP 24    5. Family history of colon cancer in Father  Encouraged colonoscopy screening    Plan/Recommendations:    Discussed findings with patient. The left eye is stable without extension of scleral thinning, recurrent corneal ulcer, nor redetachment. We can continue to monitor   Eye pressure is 24/20 with applanation and optic nerve are stable to improved right eye and stable left eye. We discussed drops for eye pressure lowering but elected to monitor given stability    Recommend Colonoscopy when possible  No drops needed for eye pressure lowering  No treatment for scleral thinning nor resolved corneal ulcer    RTC 6 months Applanate, G Top then dilate with RNFL (ordered)    Thank you for entrusting us with your care  Chelo Sorenson MD, PGY3  Ophthalmology Resident  [facility]    Attending Physician Attestation:  Complete documentation of historical and exam elements from today's encounter can be found in the full encounter summary report (not reduplicated in this progress note). I reviewed the chief complaint(s) and history of present illness, and  confirmed and edited as necessary the review of systems, past medical/surgical history, family history, social history, and examination findings as documented by others and the treating Resident or Fellow Physician.    I examined the patient myself, discussed the findings, reviewed all ancillary testing data (if any completed) and modified these results and reports (if any interpreted) along with the assessment and plan with the Treating Resident or Fellow Physician. I agree with the note as detailed above.   Ghulam Valderrama M.D.  Uveitis and Medical Retina  September 1, 2023

## 2023-09-01 NOTE — NURSING NOTE
Chief Complaints and History of Present Illnesses   Patient presents with    Retinal Evaluation     Chief Complaint(s) and History of Present Illness(es)       Retinal Evaluation              Laterality: left eye    Onset: gradual    Onset: months ago    Quality: States va is the same since last visit      Severity: moderate    Frequency: intermittently    Associated symptoms: flashes and floaters.  Negative for photophobia    Treatments tried: no treatments    Pain scale: 0/10              Comments    Arielle BAÑUELOS 7:29 AM September 1, 2023

## 2023-09-01 NOTE — LETTER
9/1/2023       RE: Paulette Abbott  [id number]  [facility] [address]     Dear Colleague,    Thank you for referring your patient, Paulette Abbott, to the [facility] [place] at [facility]. Please see a copy of my visit note below.    Chief Complaint/Presenting Concern:  Uveitis follow up    Interval History of Present Ocular Illness:  Paulette Abbott is a 62 year old patient who returns for follow up of retinal detachment and prior scleral thinning left eye. Last visit 530/23 we discussed her ocular hypertension which remained slightly elevated but we elected to observe.    Today she is here for pressure check and RNFL and Ms. Abbott reports things are stable. No  new concerns with her eyes and no increasing gray spot left eye.     Interval Updates to Medical/Family/Social History:    Tore a ligament while running in June, healing well. Colonoscopy to be scheduled    Relevant Review of Systems Updates:  No other health issues    Laboratory Testing: None     Current eye related medications: No drops    Retina/Uveitis Imaging:   OCT Spectralis Macula September 1, 2023  right eye: Myopic contour, no fluid, thin choroid. Stable   left eye: Myopic contour, ERM, no fluid, thin choroid. Stable    RNFL September 1, 2023  Right eye: Avg thickness 83 microns, borderline IT and ST (improved ST)  Left eye:Avg thickness 68 microns, stable thin sup and nasal, and borderline inf nasal    Assessment:     1. History of retinal detachment - Left Eye  Remains attached    2. Thin anterior sclera - Left Eye  Stable without extension and likely related to prior areas of surgery from scleral buckle removal    3. Corneal ulcer, left  Resolved without recurrence    4. Ocular hypertension, right eye  IOP 24    5. Family history of colon cancer in Father  Encouraged colonoscopy screening    Plan/Recommendations:    Discussed findings with patient. The left eye is  stable without extension of scleral thinning, recurrent corneal ulcer, nor redetachment. We can continue to monitor   Eye pressure is 24/20 with applanation and optic nerve are stable to improved right eye and stable left eye. We discussed drops for eye pressure lowering but elected to monitor given stability   Recommend Colonoscopy when possible  No drops needed for eye pressure lowering  No treatment for scleral thinning nor resolved corneal ulcer    RTC 6 months Applanate, G Top then dilate with RNFL (ordered)    Thank you for entrusting us with your care  Chelo Sorenson MD, PGY3  Ophthalmology Resident  [facility]    Ghulam Valderrama MD  [facility] Dept of Ophthalmology  Uveitis and Medical Retina    Again, thank you for allowing me to participate in the care of your patient.      Sincerely,    Ghulam Valderrama MD  [facility] Dept of Ophthalmology  Uveitis and Medical Retina

## 2023-11-22 NOTE — PATIENT INSTRUCTIONS
NO drops or other treatments needed  Thanks for getting the colonscopy when you have time   Ears: no ear pain and no hearing problems. Nose: no nasal congestion and no nasal drainage. Mouth/Throat: no dysphagia, no hoarseness and no throat pain. Neck: no lumps, no pain, no stiffness and no swollen glands

## 2024-08-04 ENCOUNTER — HEALTH MAINTENANCE LETTER (OUTPATIENT)
Age: 64
End: 2024-08-04

## 2024-12-21 ENCOUNTER — HEALTH MAINTENANCE LETTER (OUTPATIENT)
Age: 64
End: 2024-12-21

## 2025-06-14 ENCOUNTER — HEALTH MAINTENANCE LETTER (OUTPATIENT)
Age: 65
End: 2025-06-14

## (undated) DEVICE — SOL NACL 0.9% IRRIG 500ML BOTTLE 2F7123

## (undated) DEVICE — DRAPE STOCKINETTE 4" 8544

## (undated) DEVICE — CAST PADDING 3" COTTON SPECIALIST 100 UNSTERILE 7053

## (undated) DEVICE — PACK HAND CUSTOM ASC

## (undated) DEVICE — SLING ARM LG 79-99157

## (undated) DEVICE — DRAPE C-ARM OEC MINI VIEW 6800   00-901917-01

## (undated) DEVICE — SLING ARM MED 79-99155

## (undated) DEVICE — BNDG ELASTIC 3"X5YDS UNSTERILE 6611-30

## (undated) DEVICE — DRAPE STERI TOWEL LG 1010

## (undated) DEVICE — LINEN ORTHO PACK 5446

## (undated) DEVICE — DRILL BITS

## (undated) DEVICE — ESU PENCIL W/HOLSTER

## (undated) DEVICE — GLOVE PROTEXIS POWDER FREE 8.0 ORTHOPEDIC 2D73ET80

## (undated) DEVICE — SUCTION MANIFOLD NEPTUNE 2 SYS 1 PORT 702-025-000

## (undated) DEVICE — SOL WATER IRRIG 500ML BOTTLE 2F7113

## (undated) DEVICE — CAST PADDING 4" STERILE 9044S

## (undated) DEVICE — ESU HOLSTER PLASTIC DISP E2400

## (undated) DEVICE — GOWN XLG DISP 9545

## (undated) DEVICE — CAST PADDING 3" STERILE 9043S

## (undated) DEVICE — CAST PLASTER SPLINT 4X15" EXTRA FAST

## (undated) DEVICE — PREP CHLORHEXIDINE 4% 4OZ (HIBICLENS) 57504

## (undated) DEVICE — PREP SKIN SCRUB TRAY 4461A

## (undated) RX ORDER — FENTANYL CITRATE 50 UG/ML
INJECTION, SOLUTION INTRAMUSCULAR; INTRAVENOUS
Status: DISPENSED
Start: 2021-04-21

## (undated) RX ORDER — LIDOCAINE HYDROCHLORIDE 10 MG/ML
INJECTION, SOLUTION EPIDURAL; INFILTRATION; INTRACAUDAL; PERINEURAL
Status: DISPENSED
Start: 2021-04-17

## (undated) RX ORDER — CLINDAMYCIN PHOSPHATE 900 MG/50ML
INJECTION, SOLUTION INTRAVENOUS
Status: DISPENSED
Start: 2021-04-21

## (undated) RX ORDER — ACETAMINOPHEN 325 MG/1
TABLET ORAL
Status: DISPENSED
Start: 2021-04-21

## (undated) RX ORDER — GABAPENTIN 300 MG/1
CAPSULE ORAL
Status: DISPENSED
Start: 2021-04-21